# Patient Record
Sex: MALE | Race: WHITE | Employment: FULL TIME | URBAN - METROPOLITAN AREA
[De-identification: names, ages, dates, MRNs, and addresses within clinical notes are randomized per-mention and may not be internally consistent; named-entity substitution may affect disease eponyms.]

---

## 2020-07-21 ENCOUNTER — HOSPITAL ENCOUNTER (OUTPATIENT)
Dept: PHYSICAL THERAPY | Age: 60
Discharge: HOME | End: 2020-07-21
Payer: COMMERCIAL

## 2020-07-21 PROCEDURE — 97530 THERAPEUTIC ACTIVITIES: CPT | Performed by: PHYSICAL THERAPIST

## 2020-07-21 PROCEDURE — 97140 MANUAL THERAPY 1/> REGIONS: CPT | Performed by: PHYSICAL THERAPIST

## 2020-07-21 PROCEDURE — 97162 PT EVAL MOD COMPLEX 30 MIN: CPT | Performed by: PHYSICAL THERAPIST

## 2020-07-21 NOTE — PROGRESS NOTES
[facility] at [address] [place] [address]  Phone: [phone]   Fax: [phone]    Plan of Care/ Statement of Necessity for Physical Therapy Services     Patient name: Alok Garner Start of Care: 2020   Referral source: Sharyle Candle, MD : 1960    Medical Diagnosis: Low back pain [M54.5]   Onset Date:    Treatment Diagnosis: Low back pain    Prior Hospitalization: see medical history Provider#: [id number]   Medications: Verified on Patient summary List    Comorbidities: hx of knee surgeries , right hip resurfacing , factor 5 blood clot disorder, arthritis    Prior Level of Function: no limitations very active prior hx running 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 Assessment/ key information: Patient is a 63 y.o.male who presents to PT with Low back pain [M54.5]. Back pain x 1 year , tightness/stiffness , denies rad sx , no red flags noted , MRI per pt notes mild disc bulge at L5S1 left side. No nerve tension but tight HS L>R. Noted mild sacral torsion into flexion with mild short leg left and up crest which responded well to manual technique this session and noted looser SLR left post treatment ( approx 65 deg bilateral end session ) . LE strength is WNL 5/5 solid hold , normal reflexes LE bilat, = sensation. Tight mobility in all trunk motions no definative directional preference but states extension feels good. The patient will benefit from skilled PT services to address these deficits and facilitate return full activity level and improved quality of life.   Evaluation Complexity History HIGH Complexity :3+ comorbidities / personal factors will impact the outcome/ POC ; Examination HIGH Complexity : 4+ Standardized tests and measures addressing body structure, function, activity limitation and / or participation in recreation  ;Presentation MEDIUM Complexity : Evolving with changing characteristics  ; Clinical Decision Making MEDIUM Complexity : FOTO score of 26-74  Overall Complexity Rating: MEDIUM  Problem List: pain affecting function, decrease ROM, decrease strength, impaired gait/ balance and decrease ADL/ functional abilitiies   Treatment Plan may include any combination of the following: Therapeutic exercise, Therapeutic activities, Neuromuscular re-education, Physical agent/modality, Gait/balance training, Manual therapy, Aquatic therapy, Patient education, Self Care training and Functional mobility training  Patient / Family readiness to learn indicated by: asking questions, trying to perform skills and interest  Persons(s) to be included in education: patient (P)  Barriers to Learning/Limitations: None  Measures taken if barriers to learning:   Patient Goal (s): better continuous sleep   Patient Self Reported Health Status: good  Rehabilitation Potential: good  Short Term Goals: STG- To be accomplished in 2 week(s):  1. Pt will be compliant with HEP for max progression toward all goals and return to PLOF. Eval:started on extension focused mobility ex and trunk rotation , issued handout      2. Pt pain will improve >= 40% to allow pt improved sleep and tolerance to daily activity. Eval:max 7/10 and poor sleep         Long Term Goals: LTG- To be accomplished in 4 week(s):     1. Pt will be independant with HEP for continued and ongoing progression following discharge toward full functional mobility. Eval:started on HEP      2. Pt pain will improve >= 80% to allow pt return to PLOF. Eval:7/10 max      3. Pt FOTO score will increase by >=14  points to show improvement in functional mobility. Eval:53/100    will reassess every 5th visit      4.  Pt trunk  ROM and LE flexibility will improve to full pressup , standing flexion fingers to distal shin , sitting trunk rotation >= 60 deg , LE SLR >= 70 deg , and increase hip flexor mobility to allow upright stance  to allow pt to improve functional mobility ease with walking lifting dressing and other ADL. Eval:pressup 50%, standing flexion fingers knees to mid shin, trunk rotation in sitting left 43 deg , right 50 deg , noted flexed at hips in stance , SLR 63-65 deg       Frequency / Duration: Patient to be seen 2 times per week for 4 weeks. Patient/ Caregiver education and instruction: Diagnosis, prognosis, self care, activity modification and exercises   [x]  Plan of care has been reviewed with ZOLTAN Land, PT 7/21/2020 4:41 PM  _____________________________________________________________________  I certify that the above Therapy Services are being furnished while the patient is under my care. I agree with the treatment plan and certify that this therapy is necessary.     Physician's Signature:____________Date:_________TIME:________    ** Signature, Date and Time must be completed for valid certification **    Please sign and return to [facility] at [address] [place] [address]  Phone: [phone]   Fax: [phone]

## 2020-07-21 NOTE — PROGRESS NOTES
PT DAILY TREATMENT NOTE    Patient Name: Kajal Williamson  Date:2020  : 1960  [x]  Patient  Verified  Payor: Apoorva Mendoza / Plan: Mare Sabillon / Product Type: Commerical /    In time:1000  Out time:1105   Total Treatment Time (min): 65  Total Timed Codes (min): 25  1:1 Treatment Time ( only): 65   Visit #: 1 of 8    Treatment Area: Low back pain [M54.5]    SUBJECTIVE  Pain Level (0-10 scale): 4 tightness   Any medication changes, allergies to medications, adverse drug reactions, diagnosis change, or new procedure performed?: [x] No    [] Yes (see summary sheet for update)  Subjective functional status/changes:   [] No changes reported  Pt evaluated this date for c/o back pain / tightness x 1 year mild less after recent injection but still present , no radicular sx but MRI notes mild disc bulge per pt at L5 S1 left side, see paper evaluation for full report and measures      OBJECTIVE    40 min [x]Eval                  []Re-Eval       15 min Therapeutic Activity:  [x]  See flow sheet : beginning ex pressups , LTR , sitting rotation , standing extension , discussed findings , posture    Rationale: increase ROM and increase strength  to improve the patients ability to increase ease with ADL with less pain. 10 min Manual Therapy:  MET for sacral torsion , PA mob to low lumbar during pressups . Rationale: decrease pain, increase ROM and increase tissue extensibility to improve the patients functional mobility with less pain . With   [] TE   [] TA   [] neuro   [] other: Patient Education: [x] Review HEP    [] Progressed/Changed HEP based on:   [] positioning   [] body mechanics   [] transfers   [] heat/ice application    [] other:      Other Objective/Functional Measures: FOTO: 53 /100     Pain Level (0-10 scale) post treatment: 4 same     ASSESSMENT/Changes in Function: Patient is a 63 y.o.male who presents to PT with Low back pain [M54.5].   Back pain x 1 year , tightness/stiffness , denies rad sx , no red flags noted , MRI per pt notes mild disc bulge at L5S1 left side. No nerve tension but tight HS L>R. Noted mild sacral torsion into flexion with mild short leg left and up crest which responded well to manual technique this session and noted looser SLR left post treatment ( approx 65 deg bilateral end session ) . LE strength is WNL 5/5 solid hold , normal reflexes LE bilat, = sensation. Tight mobility in all trunk motions no definative directional preference but states extension feels good. The patient will benefit from skilled PT services to address these deficits and facilitate return full activity level and improved quality of life. Patient will continue to benefit from skilled PT services to modify and progress therapeutic interventions, address functional mobility deficits, address ROM deficits, address strength deficits, analyze and address soft tissue restrictions, analyze and cue movement patterns, analyze and modify body mechanics/ergonomics and assess and modify postural abnormalities to attain remaining goals. [x]  See Plan of Care  []  See progress note/recertification  []  See Discharge Summary         Progress towards goals / Updated goals:  Short Term Goals: STG- To be accomplished in 2 week(s):  1. Pt will be compliant with HEP for max progression toward all goals and return to PLOF. Eval:started on extension focused mobility ex and trunk rotation , issued handout     2. Pt pain will improve >= 40% to allow pt improved sleep and tolerance to daily activity. Eval:max 7/10 and poor sleep       Long Term Goals: LTG- To be accomplished in 4 week(s):    1. Pt will be independant with HEP for continued and ongoing progression following discharge toward full functional mobility. Eval:started on HEP     2.Pt pain will improve >= 80% to allow pt return to PLOF. Eval:7/10 max     3. Pt FOTO score will increase by >=14  points to show improvement in functional mobility. Eval:53/100    will reassess every 5th visit     4. Pt trunk  ROM and LE flexibility will improve to full pressup , standing flexion fingers to distal shin , sitting trunk rotation >= 60 deg , LE SLR >= 70 deg , and increase hip flexor mobility to allow upright stance  to allow pt to improve functional mobility ease with walking lifting dressing and other ADL. Eval:pressup 50%, standing flexion fingers knees to mid shin, trunk rotation in sitting left 43 deg , right 50 deg , noted flexed at hips in stance , SLR 63-65 deg         PLAN  [x]  Upgrade activities as tolerated     [x]  Continue plan of care  []  Update interventions per flow sheet       []  Discharge due to:_  []  Other:_      Marie Vincent, PT 7/21/2020  10:05 AM    No future appointments.

## 2020-07-24 ENCOUNTER — HOSPITAL ENCOUNTER (OUTPATIENT)
Dept: PHYSICAL THERAPY | Age: 60
Discharge: HOME | End: 2020-07-24
Payer: COMMERCIAL

## 2020-07-24 PROCEDURE — 97110 THERAPEUTIC EXERCISES: CPT | Performed by: PHYSICAL THERAPIST

## 2020-07-24 NOTE — PROGRESS NOTES
PT DAILY TREATMENT NOTE    Patient Name: Mane Gramajo  Date:2020  : 1960  [x]  Patient  Verified  Payor: Juanita Aggarwal / Plan: Michael Arora / Product Type: Commerical /    In time: 8:00  Out time:9:11  Total Treatment Time (min): 71  Total Timed Codes (min): 71   Visit #: 2 of 8    Treatment Area: Low back pain [M54.5]    SUBJECTIVE  Pain Level (0-10 scale): 4  Any medication changes, allergies to medications, adverse drug reactions, diagnosis change, or new procedure performed?: [x] No    [] Yes (see summary sheet for update)  Subjective functional status/changes:   [] No changes reported  Feels good. The injection probably reduced 50% of my pain. Just stiff in the morning. The pain used to be sharp    OBJECTIVE    71 min Therapeutic Exercise:  [x] See flow sheet :   Rationale: increase ROM, increase strength and decrease pain to improve the patients ability to complete ADLs    With   [] TE   [] TA   [] neuro   [] other: Patient Education: [x] Review HEP    [] Progressed/Changed HEP based on:   [] positioning   [] body mechanics   [] transfers   [] heat/ice application    [] other:      Other Objective/Functional Measures: NA     Pain Level (0-10 scale) post treatment: 3    ASSESSMENT/Changes in Function: Patient responds well to treatment session. Patient has a reduction in pain with left hip extension isometrics. Provided with updated HEP and educated on appropriate progression. Will progress as tolerated.  No adverse effects were noted from today's treatment session       Patient will continue to benefit from skilled PT services to modify and progress therapeutic interventions, address functional mobility deficits, address ROM deficits, address strength deficits, analyze and address soft tissue restrictions, analyze and cue movement patterns, analyze and modify body mechanics/ergonomics, assess and modify postural abnormalities    []  See Plan of Care  []  See progress note/recertification  []  See Discharge Summary         Progress towards goals / Updated goals:  Short Term Goals: STG- To be accomplished in 2 week(s):  1. Pt will be compliant with HEP for max progression toward all goals and return to PLOF. Eval:started on extension focused mobility ex and trunk rotation , issued handout      2. Pt pain will improve >= 40% to allow pt improved sleep and tolerance to daily activity. Eval:max 7/10 and poor sleep         Long Term Goals: LTG- To be accomplished in 4 week(s):     1. Pt will be independant with HEP for continued and ongoing progression following discharge toward full functional mobility. Eval:started on HEP      2. Pt pain will improve >= 80% to allow pt return to PLOF. Eval:7/10 max      3. Pt FOTO score will increase by >=14  points to show improvement in functional mobility. Eval:53/100    will reassess every 5th visit      4. Pt trunk  ROM and LE flexibility will improve to full pressup , standing flexion fingers to distal shin , sitting trunk rotation >= 60 deg , LE SLR >= 70 deg , and increase hip flexor mobility to allow upright stance  to allow pt to improve functional mobility ease with walking lifting dressing and other ADL.    Eval:pressup 50%, standing flexion fingers knees to mid shin, trunk rotation in sitting left 43 deg , right 50 deg , noted flexed at hips in stance , SLR 63-65 deg     PLAN  []  Upgrade activities as tolerated     [x]  Continue plan of care  []  Update interventions per flow sheet       []  Discharge due to:_  []  Other:_      Yo Mcdermott PT, DPT 7/24/2020  8:08 AM    Future Appointments   Date Time Provider Frank iSn   8/5/2020  8:00 AM Elijah Romero PT, DPT [facility]   8/7/2020  8:00 AM Elijah Romero PT, DPT [facility]   8/12/2020  8:00 AM Elijah Romero PT, DPT [facility]   8/14/2020  8:00 AM Elijah Romero PT, DPT [facility]

## 2020-08-05 ENCOUNTER — HOSPITAL ENCOUNTER (OUTPATIENT)
Dept: PHYSICAL THERAPY | Age: 60
Discharge: HOME | End: 2020-08-05
Payer: COMMERCIAL

## 2020-08-05 PROCEDURE — 97110 THERAPEUTIC EXERCISES: CPT | Performed by: PHYSICAL THERAPIST

## 2020-08-05 NOTE — PROGRESS NOTES
PT DAILY TREATMENT NOTE    Patient Name: Mane Gramajo  Date:2020  : 1960  [x]  Patient  Verified  Payor: Juanita Aggarwal / Plan: Michael Arora / Product Type: Commerical /    In time:8:45   Out time:9:31  Total Treatment Time (min): 46  Total Timed Codes (min): 46   Visit #: 3 of 8    Treatment Area: Low back pain [M54.5]    SUBJECTIVE  Pain Level (0-10 scale): 4  Any medication changes, allergies to medications, adverse drug reactions, diagnosis change, or new procedure performed?: [x] No    [] Yes (see summary sheet for update)  Subjective functional status/changes:   [] No changes reported  Feels a little better. No issues while on vacation. OBJECTIVE    46 min Therapeutic Exercise:  [x] See flow sheet :   Rationale: increase ROM, increase strength and decrease pain to improve the patients ability to complete ADLs    With   [] TE   [] TA   [] neuro   [] other: Patient Education: [x] Review HEP    [] Progressed/Changed HEP based on:   [] positioning   [] body mechanics   [] transfers   [] heat/ice application    [] other:      Other Objective/Functional Measures: NA     Pain Level (0-10 scale) post treatment: 3    ASSESSMENT/Changes in Function: Patient responds well to treatment session. Patient is progressing well. Displays good form/technique with straight leg dead lifts today. Will progress as tolerated. No adverse effects were noted from today's treatment session       Patient will continue to benefit from skilled PT services to modify and progress therapeutic interventions, address functional mobility deficits, address ROM deficits, address strength deficits, analyze and address soft tissue restrictions, analyze and cue movement patterns, analyze and modify body mechanics/ergonomics, assess and modify postural abnormalities    []  See Plan of Care  []  See progress note/recertification  []  See Discharge Summary         Progress towards goals / Updated goals:  Short Term Goals:  To be accomplished in 2 weeks:   Patient will report compliance with HEP 1x/day to aid in rehabilitation program.   Status at IE: reports compliance, Met 8/5/2020   Current: Same as IE      Patient will increase lumbar ROM to 100% in all planes to aid in completion of ADLs. Status at IE:75% flexion   Current: Same as IE    Long Term Goals: To be accomplished in 4 weeks:   Patient will report pain no greater than 1-2/10 throughout entire day to aid in completion of ADLs. Status at IE:4/1-0   Current: Same as IE     Patent will be able to sit for 1-2hours to be able to drive to appointments and complete ADLs. Status at [address] after sitting for 30mins to hour or more   Current: Same as IE     Patient will improve FOTO score to 67 points to demonstrate improvement in functional status. Status at IE:53   Current: Same as IE    *FOTO score is an established functional score where 100 = no disability*          Goals below inappopriate. Discontinued. Short Term Goals: STG- To be accomplished in 2 week(s):  1.  Pt will be compliant with HEP for max progression toward all goals and return to PLOF. Eval:started on extension focused mobility ex and trunk rotation , issued handout      2. Pt pain will improve >= 40% to allow pt improved sleep and tolerance to daily activity. Eval:max 7/10 and poor sleep         Long Term Goals: LTG- To be accomplished in 4 week(s):     1.  Pt will be independant with HEP for continued and ongoing progression following discharge toward full functional mobility. Eval:started on HEP      2. Pt pain will improve >= 80% to allow pt return to PLOF. Eval:7/10 max      3. Pt FOTO score will increase by >=14  points to show improvement in functional mobility. Eval:53/100    will reassess every 5th visit      4.  Pt trunk  ROM and LE flexibility will improve to full pressup , standing flexion fingers to distal shin , sitting trunk rotation >= 60 deg , LE SLR >= 70 deg , and increase hip flexor mobility to allow upright stance  to allow pt to improve functional mobility ease with walking lifting dressing and other ADL.    Eval:pressup 50%, standing flexion fingers knees to mid shin, trunk rotation in sitting left 43 deg , right     PLAN  []  Upgrade activities as tolerated     [x]  Continue plan of care  []  Update interventions per flow sheet       []  Discharge due to:_  []  Other:_      Ash Pickett, PT, DPT 8/5/2020  9:00 AM    Future Appointments   Date Time Provider Frank Sin   8/7/2020  8:00 AM Alvia Krabbe, PT, DPT [facility]   8/12/2020  8:00 AM Alvia Krabbe, PT, DPT [facility]   8/14/2020  8:00 AM Alvia Krabbe, PT, DPT [facility]

## 2020-08-07 ENCOUNTER — HOSPITAL ENCOUNTER (OUTPATIENT)
Dept: PHYSICAL THERAPY | Age: 60
Discharge: HOME | End: 2020-08-07
Payer: COMMERCIAL

## 2020-08-07 PROCEDURE — 97110 THERAPEUTIC EXERCISES: CPT | Performed by: PHYSICAL THERAPIST

## 2020-08-07 NOTE — PROGRESS NOTES
PT DAILY TREATMENT NOTE    Patient Name: Seb Zimmer  Date:2020  : 1960  [x]  Patient  Verified  Payor: Ghislaine Andres / Plan: Barbee Kayser / Product Type: Commerical /    In time:8:00  Out time:8:53  Total Treatment Time (min): 53  Total Timed Codes (min):53   Visit #: 4 of 8    Treatment Area: Low back pain [M54.5]    SUBJECTIVE  Pain Level (0-10 scale): 3  Any medication changes, allergies to medications, adverse drug reactions, diagnosis change, or new procedure performed?: [x] No    [] Yes (see summary sheet for update)  Subjective functional status/changes:   [] No changes reported  Feels alright. Still just stiff. OBJECTIVE    53 min Therapeutic Exercise:  [x] See flow sheet :   Rationale: increase ROM, increase strength and decrease pain to improve the patients ability to complete ADLs    With   [] TE   [] TA   [] neuro   [] other: Patient Education: [x] Review HEP    [] Progressed/Changed HEP based on:   [] positioning   [] body mechanics   [] transfers   [] heat/ice application    [] other:      Other Objective/Functional Measures: NA     Pain Level (0-10 scale) post treatment: 3    ASSESSMENT/Changes in Function: Patient responds well to treatment session. Patient is challenged with increases today. Will progress as tolerated. . No adverse effects were noted from today's treatment session       Patient will continue to benefit from skilled PT services to modify and progress therapeutic interventions, address functional mobility deficits, address ROM deficits, address strength deficits, analyze and address soft tissue restrictions, analyze and cue movement patterns, analyze and modify body mechanics/ergonomics, assess and modify postural abnormalities    []  See Plan of Care  []  See progress note/recertification  []  See Discharge Summary         Progress towards goals / Updated goals:  Short Term Goals:  To be accomplished in 2 weeks:                   Patient will report compliance with HEP 1x/day to aid in rehabilitation program.                   Status at IE: NA                   Current: reports compliance, Met 8/5/2020                      Patient will increase lumbar ROM to 100% in all planes to aid in completion of ADLs. Status at IE:75% flexion                   Current: Same as IE     Long Term Goals: To be accomplished in 4 weeks:                   Patient will report pain no greater than 1-2/10 throughout entire day to aid in completion of ADLs. Status at IE:4/1-0                   Current: Same as IE                      Patent will be able to sit for 1-2hours to be able to drive to appointments and complete ADLs. Status at [address] after sitting for 30mins to hour or more                   Current: Same as IE                      Patient will improve FOTO score to 67 points to demonstrate improvement in functional status.                    Status at IE:53                   Current: Same as IE                          *FOTO score is an established functional score where 100 = no disability*    PLAN  []  Upgrade activities as tolerated     [x]  Continue plan of care  []  Update interventions per flow sheet       []  Discharge due to:_  []  Other:_      Amy Mahmood PT, DPT 8/7/2020  8:22 AM    Future Appointments   Date Time Provider Frank Sin   8/12/2020  8:00 AM Franchesca De Leon PT, DPT [facility]   8/14/2020  8:00 AM Franchesca De Leon PT, DPT [facility]

## 2020-08-12 ENCOUNTER — HOSPITAL ENCOUNTER (OUTPATIENT)
Dept: PHYSICAL THERAPY | Age: 60
Discharge: HOME | End: 2020-08-12
Payer: COMMERCIAL

## 2020-08-12 PROCEDURE — 97110 THERAPEUTIC EXERCISES: CPT | Performed by: PHYSICAL THERAPIST

## 2020-08-12 NOTE — PROGRESS NOTES
PT DAILY TREATMENT NOTE    Patient Name: Larry Serrano  Date:2020  : 1960  [x]  Patient  Verified  Payor: Ryan Goff / Plan: Estevan Pang / Product Type: Commerical /    In time:8:00  Out time:8;46  Total Treatment Time (min): 46  Total Timed Codes (min): 46   Visit #: 5 of 8    Treatment Area: Low back pain [M54.5]    SUBJECTIVE  Pain Level (0-10 scale): 3  Any medication changes, allergies to medications, adverse drug reactions, diagnosis change, or new procedure performed?: [x] No    [] Yes (see summary sheet for update)  Subjective functional status/changes:   [] No changes reported  Had a rough day  after helping my son move into college on Saturday. But I feel good today. OBJECTIVE    46 min Therapeutic Exercise:  [x] See flow sheet :   Rationale: increase ROM, increase strength and decrease pain to improve the patients ability to complete ADLs       With   [] TE   [] TA   [] neuro   [] other: Patient Education: [x] Review HEP    [] Progressed/Changed HEP based on:   [] positioning   [] body mechanics   [] transfers   [] heat/ice application    [] other:      Other Objective/Functional Measures: NA     Pain Level (0-10 scale) post treatment: 3    ASSESSMENT/Changes in Function: Patient responds well to treatment session. Patient is progressing well. Has minimal pain or difficulty with exercises. We did not make any increase owing to his laborious weekend. Will attempt to progress exercises next session. . No adverse effects were noted from today's treatment session       Patient will continue to benefit from skilled PT services to modify and progress therapeutic interventions, address functional mobility deficits, address ROM deficits, address strength deficits, analyze and address soft tissue restrictions, analyze and cue movement patterns, analyze and modify body mechanics/ergonomics, assess and modify postural abnormalities    []  See Plan of Care  []  See progress note/recertification  []  See Discharge Summary         Progress towards goals / Updated goals:  Short Term Goals: To be accomplished in 2 weeks:                   UFHPJTS will report compliance with HEP 1x/day to aid in rehabilitation program.                   Status at IE: NA                   Current: reports compliance, Met 8/5/2020                      Patient will increase lumbar ROM to 100% in all planes to aid in completion of ADLs.                  Status at IE:75% flexion                   Current: Same as IE     Long Term Goals: To be accomplished in 4 weeks:                   Patient will report pain no greater than 1-2/10 throughout entire day to aid in completion of ADLs.                  Status at IE:4/1-0                   Current: Same as IE                      Patent will be able to sit for 1-2hours to be able to drive to appointments and complete ADLs.                  Status at [address] after sitting for 30mins to hour or more                   Current: Same as IE                      Patient will improve FOTO score to 67 points to demonstrate improvement in functional status.                    Status at IE:53                   Current: Same as LA                          *FOTO score is an established functional score where 100 = no disability*    PLAN  []  Upgrade activities as tolerated     [x]  Continue plan of care  []  Update interventions per flow sheet       []  Discharge due to:_  []  Other:_      Jimenez Sequeira PT, DPT 8/12/2020  8:21 AM    Future Appointments   Date Time Provider Frank Sin   8/14/2020  8:00 AM Eloisa Wesley PT, DPT [facility]   8/17/2020  9:30 AM Eloisa Wesley PT, DPT [facility]   8/26/2020  8:00 AM Eloisa Wesley PT, DPT [facility]   8/28/2020  9:30 AM Eloisa Wesley PT, DPT [facility]

## 2020-08-14 ENCOUNTER — HOSPITAL ENCOUNTER (OUTPATIENT)
Dept: PHYSICAL THERAPY | Age: 60
Discharge: HOME | End: 2020-08-14
Payer: COMMERCIAL

## 2020-08-14 PROCEDURE — 97110 THERAPEUTIC EXERCISES: CPT | Performed by: PHYSICAL THERAPIST

## 2020-08-14 NOTE — PROGRESS NOTES
PT DAILY TREATMENT NOTE    Patient Name: Sinai Hernandez  Date:2020  : 1960  [x]  Patient  Verified  Payor: Justin Deras / Plan: Camille Herrera / Product Type: Commerical /    In time:8:00  Out time:8:47  Total Treatment Time (min): 47  Total Timed Codes (min): 47  1:1 Treatment Time ( only): 52   Visit #: 6 of 8    Treatment Area: Low back pain [M54.5]    SUBJECTIVE  Pain Level (0-10 scale): 0  Any medication changes, allergies to medications, adverse drug reactions, diagnosis change, or new procedure performed?: [x] No    [] Yes (see summary sheet for update)  Subjective functional status/changes:   [] No changes reported  Feels okay. The LBP is getting better, even the stiffness seems to be improving    OBJECTIVE    47 min Therapeutic Exercise:  [x] See flow sheet :   Rationale: increase ROM, increase strength and decrease pain to improve the patients ability to complete ADLs         With   [] TE   [] TA   [] neuro   [] other: Patient Education: [x] Review HEP    [] Progressed/Changed HEP based on:   [] positioning   [] body mechanics   [] transfers   [] heat/ice application    [] other:      Other Objective/Functional Measures: NA     Pain Level (0-10 scale) post treatment: 0    ASSESSMENT/Changes in Function: Patient responds well to treatment session. Patient is progressing well. Continues to be challenged with exercises as prescribed.  . No adverse effects were noted from today's treatment session       Patient will continue to benefit from skilled PT services to modify and progress therapeutic interventions, address functional mobility deficits, address ROM deficits, address strength deficits, analyze and address soft tissue restrictions, analyze and cue movement patterns, analyze and modify body mechanics/ergonomics, assess and modify postural abnormalities,    []  See Plan of Care  []  See progress note/recertification  []  See Discharge Summary         Progress towards goals / Updated goals:  Short Term Goals: To be accomplished in 2 weeks:                   EIMREDU will report compliance with HEP 1x/day to aid in rehabilitation program.                   Status at IE: NA                   Current: reports compliance, Met 8/5/2020                      Patient will increase lumbar ROM to 100% in all planes to aid in completion of ADLs.                  Status at IE:75% flexion                   Current: Same as IE     Long Term Goals: To be accomplished in 4 weeks:                   Patient will report pain no greater than 1-2/10 throughout entire day to aid in completion of ADLs.                  Status at IE:4/1-0                   Current: Same as IE                      Patent will be able to sit for 1-2hours to be able to drive to appointments and complete ADLs.                  Status at [address] after sitting for 30mins to hour or more                   Current: Same as IE                      Patient will improve FOTO score to 67 points to demonstrate improvement in functional status.                    Status at IE:53                   Current: Same as DT                          *FOTO score is an established functional score where 100 = no disability*    PLAN  []  Upgrade activities as tolerated     [x]  Continue plan of care  []  Update interventions per flow sheet       []  Discharge due to:_  []  Other:_      Ursula Downs PT, DPT 8/14/2020  8:05 AM    Future Appointments   Date Time Provider Frank Sin   8/17/2020  9:30 AM Olena James PT, DPT [facility]   8/26/2020  8:00 AM Olena James PT, DPT [facility]   8/28/2020  9:30 AM Olena James PT, DPT [facility]

## 2020-08-17 ENCOUNTER — HOSPITAL ENCOUNTER (OUTPATIENT)
Dept: PHYSICAL THERAPY | Age: 60
Discharge: HOME | End: 2020-08-17
Payer: COMMERCIAL

## 2020-08-17 PROCEDURE — 97110 THERAPEUTIC EXERCISES: CPT | Performed by: PHYSICAL THERAPIST

## 2020-08-17 NOTE — PROGRESS NOTES
PT DAILY TREATMENT NOTE    Patient Name: Carlene Griffith  Date:2020  : 1960  [x]  Patient  Verified  Payor: Maria Alejandra Seen / Plan: Abundio Harry / Product Type: Commerical /    In time:9:27  Out time:10:15  Total Treatment Time (min): 48  Total Timed Codes (min): 48   Visit #: 7 of 8    Treatment Area: Low back pain [M54.5]    SUBJECTIVE  Pain Level (0-10 scale): 0  Any medication changes, allergies to medications, adverse drug reactions, diagnosis change, or new procedure performed?: [x] No    [] Yes (see summary sheet for update)  Subjective functional status/changes:   [] No changes reported  Feels good. No new issues    OBJECTIVE    48 min Therapeutic Exercise:  [x] See flow sheet :   Rationale: increase ROM, increase strength and decrease pain to improve the patients ability to complete ADLs  With   [] TE   [] TA   [] neuro   [] other: Patient Education: [x] Review HEP    [] Progressed/Changed HEP based on:   [] positioning   [] body mechanics   [] transfers   [] heat/ice application    [] other:      Other Objective/Functional Measures: NA     Pain Level (0-10 scale) post treatment: 0    ASSESSMENT/Changes in Function: Patient responds well to treatment session. Patient is progressing well. Pain is decreased. Briefly discussed plan for potential discharge to independent [facility] at the end of scheduled appointments.  . No adverse effects were noted from today's treatment session      Patient will continue to benefit from skilled PT services to modify and progress therapeutic interventions, address functional mobility deficits, address ROM deficits, address strength deficits, analyze and address soft tissue restrictions, analyze and cue movement patterns, analyze and modify body mechanics/ergonomics, assess and modify postural abnormalities    []  See Plan of Care  []  See progress note/recertification  []  See Discharge Summary         Progress towards goals / Updated goals:  Short Term Goals: To be accomplished in 2 weeks:                   Patient will report compliance with HEP 1x/day to aid in rehabilitation program.                   Status at IE: NA                   Current: reports compliance, Met 8/5/2020                      Patient will increase lumbar ROM to 100% in all planes to aid in completion of ADLs.                  Status at IE:75% flexion                   Current:100% flexion without pain, Met 8/17/2020     Long Term Goals: To be accomplished in 4 weeks:                   Patient will report pain no greater than 1-2/10 throughout entire day to aid in completion of ADLs.                  Status at IE:4/1-0                   Current: Same as IE                      Patent will be able to sit for 1-2hours to be able to drive to appointments and complete ADLs.                  Status at [address] after sitting for 30mins to hour or more                   Current: Same as IE                      Patient will improve FOTO score to 67 points to demonstrate improvement in functional status.                    Status at IE:53                   Current: Same as ZQ                          *FOTO score is an established functional score where 100 = no disability*      PLAN  []  Upgrade activities as tolerated     [x]  Continue plan of care  []  Update interventions per flow sheet       []  Discharge due to:_  []  Other:_      Roel Nowak PT, DPT 8/17/2020  9:40 AM    Future Appointments   Date Time Provider Frank Sin   8/26/2020  8:00 AM Raciel Acosta PT, DPT [facility]   8/28/2020  9:30 AM Raciel Acosta PT, DPT [facility]

## 2020-08-26 ENCOUNTER — HOSPITAL ENCOUNTER (OUTPATIENT)
Dept: PHYSICAL THERAPY | Age: 60
Discharge: HOME | End: 2020-08-26
Payer: COMMERCIAL

## 2020-08-26 PROCEDURE — 97110 THERAPEUTIC EXERCISES: CPT | Performed by: PHYSICAL THERAPIST

## 2020-08-26 NOTE — PROGRESS NOTES
[facility] at [address] [place] [address] Drive: [phone]   Fax: [phone]  Discharge Summary  Patient Name: Lisa Card : 1960   Medical   Diagnosis: Low back pain [M54.5] Treatment Diagnosis: Low back pain    Onset Date: 1 year     Referral Source: Chaz Judge MD [facility]): 2020   Prior Hospitalization: See medical history Provider #: [id number]   Prior Level of Function: Independent w/ ADLs, sedentary   Comorbidities: OA, blood-clotting disorder,    Medications: Verified on Patient Summary List      ===========================================================================================  Assessment / Summary of Care:  Lisa Card is a 63 y.o.  yo male with 1 year hx of low back pain. Patient has made good progress to date. His pain is reduced and he is capable of progressing via independent HEP at this time. . No adverse effects were noted from today's treatment session    ===========================================================================================    Plan: Discharge to independent HEP. Goals:   Short Term Goals: To be accomplished in 2 weeks:                   QILNDIW will report compliance with HEP 1x/day to aid in rehabilitation program.                   Status at IE: NA                   Current: reports compliance, Met 2020                      Patient will increase lumbar ROM to 100% in all planes to aid in completion of ADLs.                  Status at IE:75% flexion                   Current:100% flexion without pain, Met 2020     Long Term Goals: To be accomplished in 4 weeks:                   Patient will report pain no greater than 1-2/10 throughout entire day to aid in completion of ADLs.                    Status at IE:-0                   Current: 0-310, not met 2020                      Patent will be able to sit for 1-2hours to be able to drive to appointments and complete ADLs.                   Status at IE:Pain after sitting for 30mins to hour or more                   Current: able to sit for 1-2 hours, Met 8/26/2020                      Patient will improve FOTO score to 67 points to demonstrate improvement in functional status.                  Status at IE:53                   Current: 72, met 8/26/2020                         *FOTO score is an established functional score where 100 = no disability*       ===========================================================================================  Subjective: Feels good. Just stiff. Ready to be discharged to independent HEP.        Objective: % flexion    Therapist Signature: Roel Nowak PT, ALKISHA, OCS Date: 8/26/2020     Time: 11:10 AM

## 2020-08-26 NOTE — PROGRESS NOTES
PT DAILY TREATMENT NOTE    Patient Name: Manuel Echevarria  Date:2020  : 1960  [x]  Patient  Verified  Payor: Natalie Diaz / Plan: Reginal Bernheim / Product Type: Commerical /    In time:8:05  Out time:8:51  Total Treatment Time (min): 46  Total Timed Codes (min): 46   Visit #: 8 of 8    Treatment Area: Low back pain [M54.5]    SUBJECTIVE  Pain Level (0-10 scale): 0  Any medication changes, allergies to medications, adverse drug reactions, diagnosis change, or new procedure performed?: [x] No    [] Yes (see summary sheet for update)  Subjective functional status/changes:   [] No changes reported  Feels good. Just stiff. Ready to be discharged to independent HEP. OBJECTIVE    46 min Therapeutic Exercise:  [x] See flow sheet :   Rationale: increase ROM, increase strength and decrease pain to improve the patients ability to complete ADLs    With   [] TE   [] TA   [] neuro   [] other: Patient Education: [x] Review HEP    [] Progressed/Changed HEP based on:   [] positioning   [] body mechanics   [] transfers   [] heat/ice application    [] other:      Other Objective/Functional Measures: NA     Pain Level (0-10 scale) post treatment: 0    ASSESSMENT/Changes in Function: Patient responds well to treatment session. Patient has made good progress to date. His pain is reduced and he is capable of progressing via independent HEP at this time. . No adverse effects were noted from today's treatment session    []  See Plan of Care  []  See progress note/recertification  [x]  See Discharge Summary         Progress towards goals / Updated goals:  Short Term Goals: To be accomplished in 2 weeks:                   CQLEHJS will report compliance with HEP 1x/day to aid in rehabilitation program.                   Status at IE: NA                   Current: reports compliance, Met 2020                      Patient will increase lumbar ROM to 100% in all planes to aid in completion of ADLs.                    Status at IE:75% flexion                   Current:100% flexion without pain, Met 8/17/2020     Long Term Goals: To be accomplished in 4 weeks:                   Patient will report pain no greater than 1-2/10 throughout entire day to aid in completion of ADLs.                  Status at IE:4/1-0                   Current: Same as IE                      Patent will be able to sit for 1-2hours to be able to drive to appointments and complete ADLs.                  Status at [address] after sitting for 30mins to hour or more                   Current: Same as IE                      Patient will improve FOTO score to 67 points to demonstrate improvement in functional status.                  Status at IE:53                   Current: Same as IB                          *FOTO score is an established functional score where 100 = no disability*       PLAN  []  Upgrade activities as tolerated     []  Continue plan of care  []  Update interventions per flow sheet       [x]  Discharge due to:_  []  Other:_      Wayne Bucio, PT, DPT 8/26/2020  8:10 AM    No future appointments.

## 2020-08-28 ENCOUNTER — APPOINTMENT (OUTPATIENT)
Dept: PHYSICAL THERAPY | Age: 60
End: 2020-08-28
Payer: COMMERCIAL

## 2022-01-05 ENCOUNTER — HOSPITAL ENCOUNTER (OUTPATIENT)
Dept: LAB | Age: 62
Discharge: HOME | End: 2022-01-05
Payer: COMMERCIAL

## 2022-01-05 PROCEDURE — U0003 INFECTIOUS AGENT DETECTION BY NUCLEIC ACID (DNA OR RNA); SEVERE ACUTE RESPIRATORY SYNDROME CORONAVIRUS 2 (SARS-COV-2) (CORONAVIRUS DISEASE [COVID-19]), AMPLIFIED PROBE TECHNIQUE, MAKING USE OF HIGH THROUGHPUT TECHNOLOGIES AS DESCRIBED BY CMS-2020-01-R: HCPCS

## 2022-01-06 LAB — SARS-COV-2, NAA: NOT DETECTED

## 2024-10-21 ENCOUNTER — HOSPITAL ENCOUNTER (OUTPATIENT)
Facility: HOSPITAL | Age: 64
Discharge: HOME | End: 2024-10-24
Payer: COMMERCIAL

## 2024-10-21 ENCOUNTER — TRANSCRIBE ORDERS (OUTPATIENT)
Facility: HOSPITAL | Age: 64
End: 2024-10-21

## 2024-10-21 DIAGNOSIS — M16.12 PRIMARY OSTEOARTHRITIS OF LEFT HIP: Primary | ICD-10-CM

## 2024-10-21 DIAGNOSIS — M16.12 PRIMARY OSTEOARTHRITIS OF LEFT HIP: ICD-10-CM

## 2024-10-21 LAB
APPEARANCE UR: CLEAR
BILIRUB UR QL: NEGATIVE
COLOR UR: YELLOW
EST. AVERAGE GLUCOSE BLD GHB EST-MCNC: 120 MG/DL
GLUCOSE UR STRIP.AUTO-MCNC: NEGATIVE MG/DL
HBA1C MFR BLD: 5.8 % (ref 4.2–5.6)
HGB UR QL STRIP: NEGATIVE
KETONES UR QL STRIP.AUTO: NEGATIVE MG/DL
LEUKOCYTE ESTERASE UR QL STRIP.AUTO: NEGATIVE
NITRITE UR QL STRIP.AUTO: NEGATIVE
PH UR STRIP: 5.5 (ref 5–8)
PROT UR STRIP-MCNC: NEGATIVE MG/DL
SP GR UR REFRACTOMETRY: 1.02 (ref 1–1.03)
UROBILINOGEN UR QL STRIP.AUTO: 1 EU/DL (ref 0.2–1)

## 2024-10-21 PROCEDURE — 87086 URINE CULTURE/COLONY COUNT: CPT

## 2024-10-21 PROCEDURE — 81003 URINALYSIS AUTO W/O SCOPE: CPT

## 2024-10-21 PROCEDURE — 36415 COLL VENOUS BLD VENIPUNCTURE: CPT

## 2024-10-21 PROCEDURE — 83036 HEMOGLOBIN GLYCOSYLATED A1C: CPT

## 2024-10-22 LAB
BACTERIA SPEC CULT: NORMAL
SERVICE CMNT-IMP: NORMAL
SERVICE CMNT-IMP: NORMAL

## 2024-10-30 ENCOUNTER — HOSPITAL ENCOUNTER (OUTPATIENT)
Facility: HOSPITAL | Age: 64
Discharge: HOME | End: 2024-11-02
Payer: COMMERCIAL

## 2024-10-30 ENCOUNTER — TRANSCRIBE ORDERS (OUTPATIENT)
Facility: HOSPITAL | Age: 64
End: 2024-10-30

## 2024-10-30 DIAGNOSIS — M16.12 PRIMARY OSTEOARTHRITIS OF LEFT HIP: Primary | ICD-10-CM

## 2024-10-30 DIAGNOSIS — M16.12 PRIMARY OSTEOARTHRITIS OF LEFT HIP: ICD-10-CM

## 2024-10-30 DIAGNOSIS — Z01.818 PRE-OP TESTING: ICD-10-CM

## 2024-10-30 LAB
ALBUMIN SERPL-MCNC: 4 G/DL (ref 3.4–5)
ALBUMIN/GLOB SERPL: 1.3 (ref 0.8–1.7)
ALP SERPL-CCNC: 91 U/L (ref 45–117)
ALT SERPL-CCNC: 94 U/L (ref 16–61)
ANION GAP SERPL CALC-SCNC: 9 MMOL/L (ref 3–18)
APTT PPP: 27.6 SEC (ref 23–36.4)
AST SERPL-CCNC: 34 U/L (ref 10–38)
BASOPHILS # BLD: 0.1 K/UL (ref 0–0.1)
BASOPHILS NFR BLD: 1 % (ref 0–2)
BILIRUB SERPL-MCNC: 0.6 MG/DL (ref 0.2–1)
BUN SERPL-MCNC: 15 MG/DL (ref 7–18)
BUN/CREAT SERPL: 12 (ref 12–20)
CALCIUM SERPL-MCNC: 9.9 MG/DL (ref 8.5–10.1)
CHLORIDE SERPL-SCNC: 104 MMOL/L (ref 100–111)
CO2 SERPL-SCNC: 28 MMOL/L (ref 21–32)
CREAT SERPL-MCNC: 1.21 MG/DL (ref 0.6–1.3)
DIFFERENTIAL METHOD BLD: ABNORMAL
EOSINOPHIL # BLD: 0.2 K/UL (ref 0–0.4)
EOSINOPHIL NFR BLD: 3 % (ref 0–5)
ERYTHROCYTE [DISTWIDTH] IN BLOOD BY AUTOMATED COUNT: 13.6 % (ref 11.6–14.5)
GLOBULIN SER CALC-MCNC: 3.2 G/DL (ref 2–4)
GLUCOSE SERPL-MCNC: 106 MG/DL (ref 74–99)
HCT VFR BLD AUTO: 51.1 % (ref 36–48)
HGB BLD-MCNC: 17 G/DL (ref 13–16)
IMM GRANULOCYTES # BLD AUTO: 0 K/UL (ref 0–0.04)
IMM GRANULOCYTES NFR BLD AUTO: 0 % (ref 0–0.5)
INR PPP: 1 (ref 0.9–1.1)
LYMPHOCYTES # BLD: 1.8 K/UL (ref 0.9–3.6)
LYMPHOCYTES NFR BLD: 27 % (ref 21–52)
MCH RBC QN AUTO: 28.1 PG (ref 24–34)
MCHC RBC AUTO-ENTMCNC: 33.3 G/DL (ref 31–37)
MCV RBC AUTO: 84.5 FL (ref 78–100)
MONOCYTES # BLD: 0.8 K/UL (ref 0.05–1.2)
MONOCYTES NFR BLD: 12 % (ref 3–10)
NEUTS SEG # BLD: 3.7 K/UL (ref 1.8–8)
NEUTS SEG NFR BLD: 56 % (ref 40–73)
NRBC # BLD: 0 K/UL (ref 0–0.01)
NRBC BLD-RTO: 0 PER 100 WBC
PLATELET # BLD AUTO: 220 K/UL (ref 135–420)
PMV BLD AUTO: 10.6 FL (ref 9.2–11.8)
POTASSIUM SERPL-SCNC: 4.3 MMOL/L (ref 3.5–5.5)
PROT SERPL-MCNC: 7.2 G/DL (ref 6.4–8.2)
PROTHROMBIN TIME: 13 SEC (ref 11.9–14.9)
RBC # BLD AUTO: 6.05 M/UL (ref 4.35–5.65)
SODIUM SERPL-SCNC: 141 MMOL/L (ref 136–145)
WBC # BLD AUTO: 6.7 K/UL (ref 4.6–13.2)

## 2024-10-30 PROCEDURE — 85730 THROMBOPLASTIN TIME PARTIAL: CPT

## 2024-10-30 PROCEDURE — 85025 COMPLETE CBC W/AUTO DIFF WBC: CPT

## 2024-10-30 PROCEDURE — 36415 COLL VENOUS BLD VENIPUNCTURE: CPT

## 2024-10-30 PROCEDURE — 85610 PROTHROMBIN TIME: CPT

## 2024-10-30 PROCEDURE — 80053 COMPREHEN METABOLIC PANEL: CPT

## 2024-10-31 ENCOUNTER — ANESTHESIA EVENT (OUTPATIENT)
Facility: HOSPITAL | Age: 64
End: 2024-10-31
Payer: COMMERCIAL

## 2024-11-04 ENCOUNTER — APPOINTMENT (OUTPATIENT)
Facility: HOSPITAL | Age: 64
End: 2024-11-04
Attending: ORTHOPAEDIC SURGERY
Payer: COMMERCIAL

## 2024-11-04 ENCOUNTER — ANESTHESIA (OUTPATIENT)
Facility: HOSPITAL | Age: 64
End: 2024-11-04
Payer: COMMERCIAL

## 2024-11-04 ENCOUNTER — HOSPITAL ENCOUNTER (OUTPATIENT)
Facility: HOSPITAL | Age: 64
Setting detail: OUTPATIENT SURGERY
Discharge: HOME HEALTH | End: 2024-11-04
Attending: ORTHOPAEDIC SURGERY | Admitting: ORTHOPAEDIC SURGERY
Payer: COMMERCIAL

## 2024-11-04 VITALS
WEIGHT: 276.1 LBS | DIASTOLIC BLOOD PRESSURE: 52 MMHG | RESPIRATION RATE: 16 BRPM | HEART RATE: 79 BPM | BODY MASS INDEX: 38.65 KG/M2 | TEMPERATURE: 98 F | HEIGHT: 71 IN | SYSTOLIC BLOOD PRESSURE: 111 MMHG | OXYGEN SATURATION: 94 %

## 2024-11-04 DIAGNOSIS — Z96.642 STATUS POST TOTAL HIP REPLACEMENT, LEFT: Primary | ICD-10-CM

## 2024-11-04 LAB
ABO + RH BLD: NORMAL
BLOOD GROUP ANTIBODIES SERPL: NORMAL
GLUCOSE BLD STRIP.AUTO-MCNC: 127 MG/DL (ref 70–110)
SPECIMEN EXP DATE BLD: NORMAL

## 2024-11-04 PROCEDURE — 6360000002 HC RX W HCPCS: Performed by: ORTHOPAEDIC SURGERY

## 2024-11-04 PROCEDURE — 7100000010 HC PHASE II RECOVERY - FIRST 15 MIN: Performed by: ORTHOPAEDIC SURGERY

## 2024-11-04 PROCEDURE — 6360000002 HC RX W HCPCS: Performed by: NURSE ANESTHETIST, CERTIFIED REGISTERED

## 2024-11-04 PROCEDURE — 82962 GLUCOSE BLOOD TEST: CPT

## 2024-11-04 PROCEDURE — 2580000003 HC RX 258: Performed by: ORTHOPAEDIC SURGERY

## 2024-11-04 PROCEDURE — 7100000000 HC PACU RECOVERY - FIRST 15 MIN: Performed by: ORTHOPAEDIC SURGERY

## 2024-11-04 PROCEDURE — A4217 STERILE WATER/SALINE, 500 ML: HCPCS | Performed by: ORTHOPAEDIC SURGERY

## 2024-11-04 PROCEDURE — 86850 RBC ANTIBODY SCREEN: CPT

## 2024-11-04 PROCEDURE — 2580000003 HC RX 258: Performed by: NURSE ANESTHETIST, CERTIFIED REGISTERED

## 2024-11-04 PROCEDURE — 7100000001 HC PACU RECOVERY - ADDTL 15 MIN: Performed by: ORTHOPAEDIC SURGERY

## 2024-11-04 PROCEDURE — 6360000002 HC RX W HCPCS

## 2024-11-04 PROCEDURE — 2500000003 HC RX 250 WO HCPCS: Performed by: NURSE ANESTHETIST, CERTIFIED REGISTERED

## 2024-11-04 PROCEDURE — 3600000012 HC SURGERY LEVEL 2 ADDTL 15MIN: Performed by: ORTHOPAEDIC SURGERY

## 2024-11-04 PROCEDURE — 86900 BLOOD TYPING SEROLOGIC ABO: CPT

## 2024-11-04 PROCEDURE — 6370000000 HC RX 637 (ALT 250 FOR IP): Performed by: ORTHOPAEDIC SURGERY

## 2024-11-04 PROCEDURE — 2720000010 HC SURG SUPPLY STERILE: Performed by: ORTHOPAEDIC SURGERY

## 2024-11-04 PROCEDURE — 3700000000 HC ANESTHESIA ATTENDED CARE: Performed by: ORTHOPAEDIC SURGERY

## 2024-11-04 PROCEDURE — 97165 OT EVAL LOW COMPLEX 30 MIN: CPT

## 2024-11-04 PROCEDURE — 73501 X-RAY EXAM HIP UNI 1 VIEW: CPT

## 2024-11-04 PROCEDURE — 6360000002 HC RX W HCPCS: Performed by: ANESTHESIOLOGY

## 2024-11-04 PROCEDURE — 7100000011 HC PHASE II RECOVERY - ADDTL 15 MIN: Performed by: ORTHOPAEDIC SURGERY

## 2024-11-04 PROCEDURE — 97535 SELF CARE MNGMENT TRAINING: CPT

## 2024-11-04 PROCEDURE — 3600000002 HC SURGERY LEVEL 2 BASE: Performed by: ORTHOPAEDIC SURGERY

## 2024-11-04 PROCEDURE — 2709999900 HC NON-CHARGEABLE SUPPLY: Performed by: ORTHOPAEDIC SURGERY

## 2024-11-04 PROCEDURE — 36415 COLL VENOUS BLD VENIPUNCTURE: CPT

## 2024-11-04 PROCEDURE — 86901 BLOOD TYPING SEROLOGIC RH(D): CPT

## 2024-11-04 PROCEDURE — 2500000003 HC RX 250 WO HCPCS

## 2024-11-04 PROCEDURE — 3700000001 HC ADD 15 MINUTES (ANESTHESIA): Performed by: ORTHOPAEDIC SURGERY

## 2024-11-04 PROCEDURE — 2580000003 HC RX 258

## 2024-11-04 PROCEDURE — 97116 GAIT TRAINING THERAPY: CPT

## 2024-11-04 PROCEDURE — 97161 PT EVAL LOW COMPLEX 20 MIN: CPT

## 2024-11-04 PROCEDURE — 2500000003 HC RX 250 WO HCPCS: Performed by: ORTHOPAEDIC SURGERY

## 2024-11-04 PROCEDURE — 6370000000 HC RX 637 (ALT 250 FOR IP): Performed by: ANESTHESIOLOGY

## 2024-11-04 PROCEDURE — C1776 JOINT DEVICE (IMPLANTABLE): HCPCS | Performed by: ORTHOPAEDIC SURGERY

## 2024-11-04 DEVICE — 6.5MM LOW PROFILE HEX SCREW 25MM
Type: IMPLANTABLE DEVICE | Site: HIP | Status: FUNCTIONAL
Brand: TRIDENT II

## 2024-11-04 DEVICE — CERAMIC V40 FEMORAL HEAD
Type: IMPLANTABLE DEVICE | Site: HIP | Status: FUNCTIONAL
Brand: BIOLOX

## 2024-11-04 DEVICE — TRIDENT II TRITANIUM CLUSTER 54E
Type: IMPLANTABLE DEVICE | Site: HIP | Status: FUNCTIONAL
Brand: TRIDENT II

## 2024-11-04 DEVICE — HIP STEM - STANDARD OFFSET
Type: IMPLANTABLE DEVICE | Site: HIP | Status: FUNCTIONAL
Brand: INSIGNIA

## 2024-11-04 DEVICE — TRIDENT X3 0 DEGREE POLYETHYLENE INSERT
Type: IMPLANTABLE DEVICE | Site: HIP | Status: FUNCTIONAL
Brand: TRIDENT X3 INSERT

## 2024-11-04 RX ORDER — ACETAMINOPHEN 325 MG/1
650 TABLET ORAL EVERY 6 HOURS
Status: CANCELLED | OUTPATIENT
Start: 2024-11-04

## 2024-11-04 RX ORDER — SODIUM CHLORIDE 0.9 % (FLUSH) 0.9 %
5-40 SYRINGE (ML) INJECTION PRN
Status: CANCELLED | OUTPATIENT
Start: 2024-11-04

## 2024-11-04 RX ORDER — ONDANSETRON 4 MG/1
4 TABLET, ORALLY DISINTEGRATING ORAL EVERY 8 HOURS PRN
Status: CANCELLED | OUTPATIENT
Start: 2024-11-04

## 2024-11-04 RX ORDER — DROPERIDOL 2.5 MG/ML
0.62 INJECTION, SOLUTION INTRAMUSCULAR; INTRAVENOUS
Status: DISCONTINUED | OUTPATIENT
Start: 2024-11-04 | End: 2024-11-04 | Stop reason: HOSPADM

## 2024-11-04 RX ORDER — LABETALOL HYDROCHLORIDE 5 MG/ML
10 INJECTION, SOLUTION INTRAVENOUS
Status: DISCONTINUED | OUTPATIENT
Start: 2024-11-04 | End: 2024-11-04 | Stop reason: HOSPADM

## 2024-11-04 RX ORDER — MELOXICAM 7.5 MG/1
15 TABLET ORAL DAILY
Status: CANCELLED | OUTPATIENT
Start: 2024-11-04 | End: 2024-11-07

## 2024-11-04 RX ORDER — TRANEXAMIC ACID 10 MG/ML
1000 INJECTION, SOLUTION INTRAVENOUS ONCE
Status: COMPLETED | OUTPATIENT
Start: 2024-11-04 | End: 2024-11-04

## 2024-11-04 RX ORDER — HYDROMORPHONE HYDROCHLORIDE 1 MG/ML
0.5 INJECTION, SOLUTION INTRAMUSCULAR; INTRAVENOUS; SUBCUTANEOUS EVERY 5 MIN PRN
Status: DISCONTINUED | OUTPATIENT
Start: 2024-11-04 | End: 2024-11-04 | Stop reason: HOSPADM

## 2024-11-04 RX ORDER — SODIUM CHLORIDE 9 MG/ML
INJECTION, SOLUTION INTRAVENOUS PRN
Status: DISCONTINUED | OUTPATIENT
Start: 2024-11-04 | End: 2024-11-04 | Stop reason: HOSPADM

## 2024-11-04 RX ORDER — DEXAMETHASONE SODIUM PHOSPHATE 4 MG/ML
INJECTION, SOLUTION INTRA-ARTICULAR; INTRALESIONAL; INTRAMUSCULAR; INTRAVENOUS; SOFT TISSUE
Status: DISCONTINUED | OUTPATIENT
Start: 2024-11-04 | End: 2024-11-04 | Stop reason: SDUPTHER

## 2024-11-04 RX ORDER — OXYCODONE AND ACETAMINOPHEN 5; 325 MG/1; MG/1
1 TABLET ORAL EVERY 6 HOURS PRN
Qty: 28 TABLET | Refills: 0 | Status: SHIPPED | OUTPATIENT
Start: 2024-11-04 | End: 2024-11-11

## 2024-11-04 RX ORDER — MEPERIDINE HYDROCHLORIDE 50 MG/ML
12.5 INJECTION INTRAMUSCULAR; INTRAVENOUS; SUBCUTANEOUS AS NEEDED
Status: DISCONTINUED | OUTPATIENT
Start: 2024-11-04 | End: 2024-11-04 | Stop reason: HOSPADM

## 2024-11-04 RX ORDER — SODIUM CHLORIDE, SODIUM LACTATE, POTASSIUM CHLORIDE, CALCIUM CHLORIDE 600; 310; 30; 20 MG/100ML; MG/100ML; MG/100ML; MG/100ML
INJECTION, SOLUTION INTRAVENOUS CONTINUOUS
Status: CANCELLED | OUTPATIENT
Start: 2024-11-04

## 2024-11-04 RX ORDER — DIPHENHYDRAMINE HYDROCHLORIDE 50 MG/ML
12.5 INJECTION INTRAMUSCULAR; INTRAVENOUS
Status: DISCONTINUED | OUTPATIENT
Start: 2024-11-04 | End: 2024-11-04 | Stop reason: HOSPADM

## 2024-11-04 RX ORDER — OXYCODONE HYDROCHLORIDE 5 MG/1
5 TABLET ORAL EVERY 4 HOURS PRN
Status: DISCONTINUED | OUTPATIENT
Start: 2024-11-04 | End: 2024-11-04 | Stop reason: HOSPADM

## 2024-11-04 RX ORDER — NALOXONE HYDROCHLORIDE 0.4 MG/ML
INJECTION, SOLUTION INTRAMUSCULAR; INTRAVENOUS; SUBCUTANEOUS PRN
Status: DISCONTINUED | OUTPATIENT
Start: 2024-11-04 | End: 2024-11-04 | Stop reason: HOSPADM

## 2024-11-04 RX ORDER — POVIDONE-IODINE 5 %
SOLUTION, NON-ORAL OPHTHALMIC (EYE) PRN
Status: DISCONTINUED | OUTPATIENT
Start: 2024-11-04 | End: 2024-11-04 | Stop reason: ALTCHOICE

## 2024-11-04 RX ORDER — LIDOCAINE HYDROCHLORIDE 20 MG/ML
INJECTION, SOLUTION EPIDURAL; INFILTRATION; INTRACAUDAL; PERINEURAL
Status: DISCONTINUED | OUTPATIENT
Start: 2024-11-04 | End: 2024-11-04 | Stop reason: SDUPTHER

## 2024-11-04 RX ORDER — SODIUM CHLORIDE 9 MG/ML
INJECTION, SOLUTION INTRAVENOUS PRN
Status: CANCELLED | OUTPATIENT
Start: 2024-11-04

## 2024-11-04 RX ORDER — SODIUM CHLORIDE 0.9 % (FLUSH) 0.9 %
5-40 SYRINGE (ML) INJECTION EVERY 12 HOURS SCHEDULED
Status: DISCONTINUED | OUTPATIENT
Start: 2024-11-04 | End: 2024-11-04 | Stop reason: HOSPADM

## 2024-11-04 RX ORDER — SODIUM CHLORIDE 0.9 % (FLUSH) 0.9 %
5-40 SYRINGE (ML) INJECTION PRN
Status: DISCONTINUED | OUTPATIENT
Start: 2024-11-04 | End: 2024-11-04 | Stop reason: HOSPADM

## 2024-11-04 RX ORDER — SODIUM CHLORIDE, SODIUM LACTATE, POTASSIUM CHLORIDE, CALCIUM CHLORIDE 600; 310; 30; 20 MG/100ML; MG/100ML; MG/100ML; MG/100ML
INJECTION, SOLUTION INTRAVENOUS CONTINUOUS
Status: DISCONTINUED | OUTPATIENT
Start: 2024-11-04 | End: 2024-11-04 | Stop reason: HOSPADM

## 2024-11-04 RX ORDER — IPRATROPIUM BROMIDE AND ALBUTEROL SULFATE 2.5; .5 MG/3ML; MG/3ML
1 SOLUTION RESPIRATORY (INHALATION)
Status: DISCONTINUED | OUTPATIENT
Start: 2024-11-04 | End: 2024-11-04 | Stop reason: HOSPADM

## 2024-11-04 RX ORDER — ASPIRIN 81 MG/1
81 TABLET ORAL 2 TIMES DAILY
Qty: 60 TABLET | Refills: 0 | Status: SHIPPED | OUTPATIENT
Start: 2024-11-04

## 2024-11-04 RX ORDER — PROPOFOL 10 MG/ML
INJECTION, EMULSION INTRAVENOUS
Status: DISCONTINUED | OUTPATIENT
Start: 2024-11-04 | End: 2024-11-04 | Stop reason: SDUPTHER

## 2024-11-04 RX ORDER — MAGNESIUM HYDROXIDE 1200 MG/15ML
LIQUID ORAL CONTINUOUS PRN
Status: COMPLETED | OUTPATIENT
Start: 2024-11-04 | End: 2024-11-04

## 2024-11-04 RX ORDER — SODIUM CHLORIDE 9 MG/ML
INJECTION, SOLUTION INTRAVENOUS CONTINUOUS
Status: DISCONTINUED | OUTPATIENT
Start: 2024-11-04 | End: 2024-11-04 | Stop reason: HOSPADM

## 2024-11-04 RX ORDER — FENTANYL CITRATE 50 UG/ML
25 INJECTION, SOLUTION INTRAMUSCULAR; INTRAVENOUS EVERY 5 MIN PRN
Status: DISCONTINUED | OUTPATIENT
Start: 2024-11-04 | End: 2024-11-04 | Stop reason: HOSPADM

## 2024-11-04 RX ORDER — OXYCODONE HYDROCHLORIDE 5 MG/1
5 TABLET ORAL
Status: COMPLETED | OUTPATIENT
Start: 2024-11-04 | End: 2024-11-04

## 2024-11-04 RX ORDER — GLYCOPYRROLATE 0.2 MG/ML
INJECTION INTRAMUSCULAR; INTRAVENOUS
Status: DISCONTINUED | OUTPATIENT
Start: 2024-11-04 | End: 2024-11-04 | Stop reason: SDUPTHER

## 2024-11-04 RX ORDER — ONDANSETRON 2 MG/ML
INJECTION INTRAMUSCULAR; INTRAVENOUS
Status: DISCONTINUED | OUTPATIENT
Start: 2024-11-04 | End: 2024-11-04 | Stop reason: SDUPTHER

## 2024-11-04 RX ORDER — SODIUM CHLORIDE 0.9 % (FLUSH) 0.9 %
5-40 SYRINGE (ML) INJECTION EVERY 12 HOURS SCHEDULED
Status: CANCELLED | OUTPATIENT
Start: 2024-11-04

## 2024-11-04 RX ORDER — FENTANYL CITRATE 50 UG/ML
INJECTION, SOLUTION INTRAMUSCULAR; INTRAVENOUS
Status: DISCONTINUED | OUTPATIENT
Start: 2024-11-04 | End: 2024-11-04 | Stop reason: SDUPTHER

## 2024-11-04 RX ORDER — SODIUM CHLORIDE, SODIUM LACTATE, POTASSIUM CHLORIDE, CALCIUM CHLORIDE 600; 310; 30; 20 MG/100ML; MG/100ML; MG/100ML; MG/100ML
INJECTION, SOLUTION INTRAVENOUS
Status: DISCONTINUED | OUTPATIENT
Start: 2024-11-04 | End: 2024-11-04 | Stop reason: SDUPTHER

## 2024-11-04 RX ORDER — OXYCODONE HYDROCHLORIDE 5 MG/1
10 TABLET ORAL EVERY 4 HOURS PRN
Status: DISCONTINUED | OUTPATIENT
Start: 2024-11-04 | End: 2024-11-04 | Stop reason: HOSPADM

## 2024-11-04 RX ORDER — ONDANSETRON 2 MG/ML
4 INJECTION INTRAMUSCULAR; INTRAVENOUS
Status: DISCONTINUED | OUTPATIENT
Start: 2024-11-04 | End: 2024-11-04 | Stop reason: HOSPADM

## 2024-11-04 RX ORDER — MIDAZOLAM HYDROCHLORIDE 1 MG/ML
INJECTION, SOLUTION INTRAMUSCULAR; INTRAVENOUS
Status: DISCONTINUED | OUTPATIENT
Start: 2024-11-04 | End: 2024-11-04 | Stop reason: SDUPTHER

## 2024-11-04 RX ORDER — ONDANSETRON 2 MG/ML
4 INJECTION INTRAMUSCULAR; INTRAVENOUS EVERY 6 HOURS PRN
Status: CANCELLED | OUTPATIENT
Start: 2024-11-04

## 2024-11-04 RX ORDER — CEPHALEXIN 500 MG/1
500 CAPSULE ORAL 4 TIMES DAILY
Qty: 8 CAPSULE | Refills: 0 | Status: SHIPPED | OUTPATIENT
Start: 2024-11-04

## 2024-11-04 RX ORDER — ASPIRIN 325 MG
325 TABLET, DELAYED RELEASE (ENTERIC COATED) ORAL 2 TIMES DAILY
Status: CANCELLED | OUTPATIENT
Start: 2024-11-04

## 2024-11-04 RX ADMIN — GLYCOPYRROLATE 0.1 MG: 0.2 INJECTION INTRAMUSCULAR; INTRAVENOUS at 07:45

## 2024-11-04 RX ADMIN — WATER 3000 MG: 1 INJECTION INTRAMUSCULAR; INTRAVENOUS; SUBCUTANEOUS at 12:53

## 2024-11-04 RX ADMIN — TRANEXAMIC ACID 1000 MG: 10 INJECTION, SOLUTION INTRAVENOUS at 08:00

## 2024-11-04 RX ADMIN — FENTANYL CITRATE 25 MCG: 50 INJECTION, SOLUTION INTRAMUSCULAR; INTRAVENOUS at 10:13

## 2024-11-04 RX ADMIN — TRANEXAMIC ACID 1000 MG: 10 INJECTION, SOLUTION INTRAVENOUS at 10:13

## 2024-11-04 RX ADMIN — MIDAZOLAM 2 MG: 1 INJECTION INTRAMUSCULAR; INTRAVENOUS at 07:45

## 2024-11-04 RX ADMIN — PROPOFOL 30 MG: 10 INJECTION, EMULSION INTRAVENOUS at 08:03

## 2024-11-04 RX ADMIN — MEPIVACAINE HYDROCHLORIDE 60 MG: 15 INJECTION, SOLUTION EPIDURAL; INFILTRATION at 07:54

## 2024-11-04 RX ADMIN — FENTANYL CITRATE 25 MCG: 50 INJECTION, SOLUTION INTRAMUSCULAR; INTRAVENOUS at 08:35

## 2024-11-04 RX ADMIN — FENTANYL CITRATE 25 MCG: 50 INJECTION INTRAMUSCULAR; INTRAVENOUS at 11:26

## 2024-11-04 RX ADMIN — SODIUM CHLORIDE, SODIUM LACTATE, POTASSIUM CHLORIDE, AND CALCIUM CHLORIDE: 600; 310; 30; 20 INJECTION, SOLUTION INTRAVENOUS at 10:45

## 2024-11-04 RX ADMIN — FENTANYL CITRATE 25 MCG: 50 INJECTION, SOLUTION INTRAMUSCULAR; INTRAVENOUS at 09:56

## 2024-11-04 RX ADMIN — SODIUM CHLORIDE: 9 INJECTION, SOLUTION INTRAVENOUS at 06:18

## 2024-11-04 RX ADMIN — MIDAZOLAM 2 MG: 1 INJECTION INTRAMUSCULAR; INTRAVENOUS at 07:49

## 2024-11-04 RX ADMIN — FENTANYL CITRATE 25 MCG: 50 INJECTION, SOLUTION INTRAMUSCULAR; INTRAVENOUS at 09:36

## 2024-11-04 RX ADMIN — FENTANYL CITRATE 25 MCG: 50 INJECTION INTRAMUSCULAR; INTRAVENOUS at 11:35

## 2024-11-04 RX ADMIN — DEXAMETHASONE SODIUM PHOSPHATE 10 MG: 4 INJECTION INTRA-ARTICULAR; INTRALESIONAL; INTRAMUSCULAR; INTRAVENOUS; SOFT TISSUE at 08:11

## 2024-11-04 RX ADMIN — FENTANYL CITRATE 25 MCG: 50 INJECTION, SOLUTION INTRAMUSCULAR; INTRAVENOUS at 08:03

## 2024-11-04 RX ADMIN — WATER 3000 MG: 1 INJECTION INTRAMUSCULAR; INTRAVENOUS; SUBCUTANEOUS at 07:58

## 2024-11-04 RX ADMIN — FENTANYL CITRATE 25 MCG: 50 INJECTION, SOLUTION INTRAMUSCULAR; INTRAVENOUS at 07:46

## 2024-11-04 RX ADMIN — FENTANYL CITRATE 25 MCG: 50 INJECTION, SOLUTION INTRAMUSCULAR; INTRAVENOUS at 09:17

## 2024-11-04 RX ADMIN — ONDANSETRON HYDROCHLORIDE 4 MG: 2 INJECTION INTRAMUSCULAR; INTRAVENOUS at 08:08

## 2024-11-04 RX ADMIN — FENTANYL CITRATE 25 MCG: 50 INJECTION, SOLUTION INTRAMUSCULAR; INTRAVENOUS at 10:23

## 2024-11-04 RX ADMIN — OXYCODONE 5 MG: 5 TABLET ORAL at 12:19

## 2024-11-04 RX ADMIN — LIDOCAINE HYDROCHLORIDE 50 MG: 20 INJECTION, SOLUTION EPIDURAL; INFILTRATION; INTRACAUDAL; PERINEURAL at 07:58

## 2024-11-04 RX ADMIN — PROPOFOL 100 MCG/KG/MIN: 10 INJECTION, EMULSION INTRAVENOUS at 08:06

## 2024-11-04 RX ADMIN — PROPOFOL 30 MG: 10 INJECTION, EMULSION INTRAVENOUS at 07:58

## 2024-11-04 ASSESSMENT — PAIN DESCRIPTION - ORIENTATION
ORIENTATION: LEFT
ORIENTATION: RIGHT
ORIENTATION: LEFT

## 2024-11-04 ASSESSMENT — PAIN DESCRIPTION - ONSET
ONSET: ON-GOING

## 2024-11-04 ASSESSMENT — HOOS JR
RISING FROM SITTING: MODERATE
LYING IN BED (TURNING OVER, MAINTAINING HIP POSITION): SEVERE
WALKING ON UNEVEN SURFACE: EXTREME
HOOS JR TOTAL INTERVAL SCORE: 39.902
GOING UP OR DOWN STAIRS: MODERATE
HOOS JR RAW SCORE: 16
BENDING TO THE FLOOR TO PICK UP OBJECT: SEVERE
SITTING: MODERATE
HOOS JR RAW SCORE: 16

## 2024-11-04 ASSESSMENT — PAIN DESCRIPTION - DESCRIPTORS
DESCRIPTORS: ACHING
DESCRIPTORS: SORE
DESCRIPTORS: ACHING

## 2024-11-04 ASSESSMENT — PAIN DESCRIPTION - LOCATION
LOCATION: HIP

## 2024-11-04 ASSESSMENT — PROMIS GLOBAL HEALTH SCALE
TO WHAT EXTENT ARE YOU ABLE TO CARRY OUT YOUR EVERYDAY PHYSICAL ACTIVITIES SUCH AS WALKING, CLIMBING STAIRS, CARRYING GROCERIES, OR MOVING A CHAIR [ON A SCALE OF 1 (NOT AT ALL) TO 5 (COMPLETELY)]?: MODERATELY
IN GENERAL, WOULD YOU SAY YOUR QUALITY OF LIFE IS...[ON A SCALE OF 1 (POOR) TO 5 (EXCELLENT)]: VERY GOOD
IN THE PAST 7 DAYS, HOW OFTEN HAVE YOU BEEN BOTHERED BY EMOTIONAL PROBLEMS, SUCH AS FEELING ANXIOUS, DEPRESSED, OR IRRITABLE [ON A SCALE FROM 1 (NEVER) TO 5 (ALWAYS)]?: RARELY
IN GENERAL, WOULD YOU SAY YOUR HEALTH IS...[ON A SCALE OF 1 (POOR) TO 5 (EXCELLENT)]: GOOD
SUM OF RESPONSES TO QUESTIONS 3, 6, 7, & 8: 16
HOW IS THE PROMIS V1.1 BEING ADMINISTERED?: PAPER
IN THE PAST 7 DAYS, HOW WOULD YOU RATE YOUR PAIN ON AVERAGE [ON A SCALE FROM 0 (NO PAIN) TO 10 (WORST IMAGINABLE PAIN)]?: 6
IN GENERAL, HOW WOULD YOU RATE YOUR SATISFACTION WITH YOUR SOCIAL ACTIVITIES AND RELATIONSHIPS [ON A SCALE OF 1 (POOR) TO 5 (EXCELLENT)]?: FAIR
IN GENERAL, HOW WOULD YOU RATE YOUR MENTAL HEALTH, INCLUDING YOUR MOOD AND YOUR ABILITY TO THINK [ON A SCALE OF 1 (POOR) TO 5 (EXCELLENT)]?: GOOD
IN GENERAL, PLEASE RATE HOW WELL YOU CARRY OUT YOUR USUAL SOCIAL ACTIVITIES (INCLUDES ACTIVITIES AT HOME, AT WORK, AND IN YOUR COMMUNITY, AND RESPONSIBILITIES AS A PARENT, CHILD, SPOUSE, EMPLOYEE, FRIEND, ETC) [ON A SCALE OF 1 (POOR) TO 5 (EXCELLENT)]?: FAIR
SUM OF RESPONSES TO QUESTIONS 2, 4, 5, & 10: 13
IN GENERAL, HOW WOULD YOU RATE YOUR PHYSICAL HEALTH [ON A SCALE OF 1 (POOR) TO 5 (EXCELLENT)]?: GOOD
WHO IS THE PERSON COMPLETING THE PROMIS V1.1 SURVEY?: SELF
IN THE PAST 7 DAYS, HOW WOULD YOU RATE YOUR FATIGUE ON AVERAGE [ON A SCALE FROM 1 (NONE) TO 5 (VERY SEVERE)]?: MILD

## 2024-11-04 ASSESSMENT — PAIN DESCRIPTION - FREQUENCY
FREQUENCY: CONTINUOUS

## 2024-11-04 ASSESSMENT — PAIN SCALES - GENERAL
PAINLEVEL_OUTOF10: 6
PAINLEVEL_OUTOF10: 5
PAINLEVEL_OUTOF10: 7
PAINLEVEL_OUTOF10: 6
PAINLEVEL_OUTOF10: 5
PAINLEVEL_OUTOF10: 0
PAINLEVEL_OUTOF10: 0

## 2024-11-04 ASSESSMENT — PAIN - FUNCTIONAL ASSESSMENT
PAIN_FUNCTIONAL_ASSESSMENT: ACTIVITIES ARE NOT PREVENTED
PAIN_FUNCTIONAL_ASSESSMENT: ACTIVITIES ARE NOT PREVENTED
PAIN_FUNCTIONAL_ASSESSMENT: PREVENTS OR INTERFERES SOME ACTIVE ACTIVITIES AND ADLS
PAIN_FUNCTIONAL_ASSESSMENT: ACTIVITIES ARE NOT PREVENTED
PAIN_FUNCTIONAL_ASSESSMENT: 0-10

## 2024-11-04 ASSESSMENT — PAIN DESCRIPTION - PAIN TYPE
TYPE: SURGICAL PAIN

## 2024-11-04 NOTE — H&P
history on file.    Allergies:   Allergies   Allergen Reactions    Codeine Headaches        Review of Systems:  A comprehensive review of systems was negative except for that written in the History of Present Illness.    Objective:       Physical Exam:  HEENT: Normocephalic, atraumatic  Lungs:  Clear to auscultation  Heart:   Regular rate and rhythm  Abdomen: Soft  Extremities:  Pain with range of motion of the left hip, groin tenderness, and antalgic gait.    Assessment:      Arthritis of the left hip.    Plan:       Proceed with scheduled left total hip replacement.  The various methods of treatment have been discussed with the patient and family. After consideration of risks, benefits, and other options for treatment, the patient has consented to surgical interventions. Questions were answered and preoperative teaching was done by Dr. Lara.     Signed By: Sandra Major PA-C     November 3, 2024

## 2024-11-04 NOTE — DISCHARGE INSTRUCTIONS
bleeding inside the surgery area. Your doctor also may have given you medicines that help stop the bleeding.  How can you care for yourself at home?  If you have strips of tape on the incision, leave the tape on until it falls off. Or follow your doctor's instructions for removing the tape. Keep the area dry at all times.  You will have a dressing over the incision. A dressing helps the incision heal and protects it. Your doctor will tell you how to take care of this.  If you do not have tape on the incision, wash the area daily with warm, soapy water, and pat it dry. Don't use hydrogen peroxide or alcohol, which can slow healing.    When should you call for help?    Call your doctor now or seek immediate medical care if:    You are dizzy or lightheaded, or you feel like you may faint.     You have bleeding that starts again or gets worse, such as soaking one or more bandages over 2 to 4 hours, even after holding pressure on the area.         __________________________________________________________________________________________________________________________________

## 2024-11-04 NOTE — PERIOP NOTE
Reviewed discharge plan of care with patient and his wife, written instructions provided as well. They verbalized understanding

## 2024-11-04 NOTE — INTERVAL H&P NOTE
Update History & Physical    The patient's History and Physical of November 3, 2024 was reviewed with the patient and I examined the patient. There was no change. The surgical site was confirmed by the patient and me.     Plan: The risks, benefits, expected outcome, and alternative to the recommended procedure have been discussed with the patient. Patient understands and wants to proceed with the procedure.     Electronically signed by THOM SCHMIDT MD on 11/4/2024 at 7:23 AM

## 2024-11-04 NOTE — ANESTHESIA PROCEDURE NOTES
Spinal Block    End time: 11/4/2024 7:54 AM  Reason for block: primary anesthetic  Staffing  Performed: resident/CRNA   Resident/CRNA: Dinorah Mccarty APRN - CRNA  Performed by: Dinorah Mccarty APRN - CRNA  Authorized by: Bk Loera MD    Spinal Block  Patient position: sitting  Prep: Betadine  Patient monitoring: frequent blood pressure checks and continuous pulse ox  Approach: midline  Location: L3/L4  Provider prep: mask and sterile gloves  Local infiltration: lidocaine  Needle  Needle type: Pencan   Needle gauge: 24 G  Needle length: 4 in  Assessment  Swirl obtained: Yes  CSF: clear  Attempts: 1  Hemodynamics: stable  Preanesthetic Checklist  Completed: patient identified, IV checked, site marked, risks and benefits discussed, surgical/procedural consents, equipment checked, pre-op evaluation, timeout performed, anesthesia consent given, oxygen available, monitors applied/VS acknowledged, fire risk safety assessment completed and verbalized and blood product R/B/A discussed and consented

## 2024-11-04 NOTE — ANESTHESIA PRE PROCEDURE
Department of Anesthesiology  Preprocedure Note       Name:  Jesus Martinez   Age:  64 y.o.  :  1960                                          MRN:  [id number]         Date:  2024      Surgeon: Surgeon(s):  Adi Lara MD    Procedure: Procedure(s):  LEFT TOTAL HIP REPLACEMENT ANTERIOR APPROACH WITH C-ARM \"SPEC POP\"    Medications prior to admission:   Prior to Admission medications    Medication Sig Start Date End Date Taking? Authorizing Provider   levothyroxine (SYNTHROID) 75 MCG tablet Take 1 tablet by mouth Daily   Yes Yon Huitron MD   atorvastatin (LIPITOR) 40 MG tablet Take 1 tablet by mouth daily   Yes Yon Huitron MD   testosterone (ANDROGEL; TESTIM) 50 MG/5GM (1%) GEL 1% gel Apply topically three times a week.    Yon Huitron MD   lisinopril (PRINIVIL;ZESTRIL) 10 MG tablet Take 1 tablet by mouth daily    Yon Huitron MD   Meloxicam 15 MG TBDP Take by mouth daily    Yon Huitron MD   tirzepatide-weight management (ZEPBOUND) 2.5 MG/0.5ML SOAJ subCUTAneous auto-injector pen once a week 24   Yon Huitron MD   Omega-3 Fatty Acids (FISH OIL) 1000 MG capsule Take by mouth daily    Yon Huitron MD   glucosamine-chondroitin 500-400 MG tablet Take 1 tablet by mouth daily    Yon Huitron MD       Current medications:    Current Facility-Administered Medications   Medication Dose Route Frequency Provider Last Rate Last Admin   • ceFAZolin (ANCEF) 3,000 mg in sterile water 30 mL IV syringe  3,000 mg IntraVENous On Call to OR Adi Lara MD       • 0.9 % sodium chloride infusion   IntraVENous Continuous Adi Lara MD 50 mL/hr at 24 0618 New Bag at 24 0618       Allergies:    Allergies   Allergen Reactions   • Codeine Headaches       Problem List:  There is no problem list on file for this patient.      Past Medical History:        Diagnosis Date   • Arthritis     hip, lumbar   • Factor V Leiden (HCC)    • Hx of

## 2024-11-04 NOTE — PROGRESS NOTES
Asked for sign out   
Patient tolerating ice chips.   
TRANSFER - IN REPORT:    Verbal report received from RN,CRNA on Jesus Martinez  being received from OR for routine post-op      Report consisted of patient's Situation, Background, Assessment and   Recommendations(SBAR).     Information from the following report(s) Nurse Handoff Report was reviewed with the receiving nurse.    Opportunity for questions and clarification was provided.      Assessment completed upon patient's arrival to unit and care assumed.     
TRANSFER - OUT REPORT:    Verbal report given to JAZIEL Carmichael on Jesus Martinez  being transferred to [facility] for routine progression of patient care       Report consisted of patient's Situation, Background, Assessment and   Recommendations(SBAR).     Information from the following report(s) Nurse Handoff Report, Adult Overview, Surgery Report, Intake/Output, and MAR was reviewed with the receiving nurse.           Lines:   Peripheral IV 11/04/24 Distal;Left;Posterior Forearm (Active)   Site Assessment Clean, dry & intact 11/04/24 1137   Line Status Infusing 11/04/24 1137   Line Care Connections checked and tightened 11/04/24 1137   Phlebitis Assessment No symptoms 11/04/24 1137   Infiltration Assessment 0 11/04/24 1137   Alcohol Cap Used No 11/04/24 1137   Dressing Status Clean, dry & intact 11/04/24 1137   Dressing Type Transparent 11/04/24 1137   Dressing Intervention New 11/04/24 1137        Opportunity for questions and clarification was provided.      Patient transported with:  Registered Nurse        
  Patient stated “Is it normal to feel a little dizzy?”    OBJECTIVE DATA SUMMARY:     Past Medical History:   Diagnosis Date    Arthritis     hip, lumbar    Factor V Leiden (HCC)     Hx of blood clots     Hyperlipidemia     Hypertension     2004    Thyroid disease     hypo     Past Surgical History:   Procedure Laterality Date    ANTERIOR CRUCIATE LIGAMENT REPAIR Left     knee    JOINT REPLACEMENT Right     hip    KNEE SURGERY Right     cartilage    TONSILLECTOMY         Home Situation:  Social/Functional History  Lives With: Spouse  Type of Home: House  Home Layout: One level  Home Access: Stairs to enter with rails  Entrance Stairs - Number of Steps: 3-4  Entrance Stairs - Rails: Both (wide)  Bathroom Shower/Tub: Walk-in shower  Bathroom Toilet: Handicap height  Bathroom Equipment: Built-in shower seat  Home Equipment: Cane, Walker - Rolling  Critical Behavior:  Orientation  Orientation Level: Oriented to place;Oriented to situation;Oriented to person  Cognition  Overall Cognitive Status: WFL  Strength:    Strength: Generally decreased, functional  Tone & Sensation:   Tone: Normal  Sensation: Impaired (L hip)  Range Of Motion:  AROM: Generally decreased, functional  PROM: Generally decreased, functional  Functional Mobility:  Bed Mobility:  Bed Mobility Training  Bed Mobility Training: No  Transfers:  Transfer Training  Transfer Training: Yes  Interventions: Safety awareness training;Verbal cues;Tactile cues  Sit to Stand: Contact-guard assistance;Additional time  Stand to Sit: Contact-guard assistance;Additional time  Balance:   Balance  Sitting: Intact  Standing: Impaired  Standing - Static: Fair;Constant support  Standing - Dynamic: Fair;Constant support  Wheelchair Mobility:  Ambulation/Gait Training:  Gait  Gait Training: Yes  Left Side Weight Bearing: As tolerated  Overall Level of Assistance: Contact-guard assistance;Additional time  Distance (ft): 75 Feet  Assistive Device: Gait belt;Walker, 
the patient's independence.

## 2024-11-04 NOTE — OP NOTE
[facility]  Total Left Hip Arthroplasty    Patient: Jesus Martinez MRN: [id number]  SSN: [id number]    YOB: 1960  Age: 64 y.o.  Sex: male      Date of Surgery: [unfilled]   Preoperative Diagnosis: Osteoarthritis of left hip, unspecified osteoarthritis type [M16.12]   Postoperative Diagnosis: Osteoarthritis of left hip, unspecified osteoarthritis type [M16.12]    Location: [facility]  Surgeon: THOM SCHMIDT MD  Assistant:  Sandra PATEL    Anesthesia: Spinal     Procedure: Total Left Hip Arthroplasty    Findings:  Degenerative joint disease of the left hip.     Estimated Blood Loss: 350 cc    Specimens: None    Complications: None    Implants:   Implant Name Type Inv. Item Serial No.  Lot No. LRB No. Used Action   SHELL ACET SZ E [id number] 5 CLUS H TRITANIUM PRESSFIT RUBIO - [id number]  SHELL ACET SZ E [id number] 5 CLUS H TRITANIUM PRESSFIT RUBIO  [employer] [facility] [id number] Left 1 Implanted   SCREW BNE L25MM DIA6.5MM HEX LO PROF TRIDENT II - [id number]  SCREW BNE L25MM DIA6.5MM HEX LO PROF TRIDENT II  [employer] [facility] [id number] Left 1 Implanted   INSERT ACET E 0 DEG 36 MM HIP X3 TRIDENT - [id number]  INSERT ACET E 0 DEG 36 MM HIP X3 TRIDENT  [employer] [facility] [id number] Left 1 Implanted   STEM FEM STD OFFSET 6 HIP CLLRD INSIGNIA - [id number]  STEM FEM STD OFFSET 6 HIP CLLRD INSIGNIA  [employer] [facility] [id number] Left 1 Implanted   HEAD FEM [id number] -2.5MM OFFSET HIP BIOLOX DELT CERAMIC TAPR - [id number]  HEAD FEM [id number] -2.5MM OFFSET HIP BIOLOX DELT CERAMIC TAPR  [employer] [facility] [id number] Left 1 Implanted       Procedure Detail:  After the patient was brought to the operating suite, He was effectively anesthetized using general anesthesia, then transferred to the Tripp table and secured in a standard fashion. His left hip was then prepped with Chloroprep and draped in a normal sterile orthopedic

## 2024-11-04 NOTE — BRIEF OP NOTE
Brief Postoperative Note      Patient: Jesus Martinez  YOB: 1960  MRN: [id number]    Date of Procedure: 11/4/2024    Pre-Op Diagnosis Codes:      * Osteoarthritis of left hip, unspecified osteoarthritis type [M16.12]    Post-Op Diagnosis: Same       Procedure(s):  LEFT TOTAL HIP REPLACEMENT ANTERIOR APPROACH WITH C-ARM \"SPEC POP\"    Surgeon(s):  Adi Lara MD    Assistant:  * No surgical staff found *    Anesthesia: Spinal    Estimated Blood Loss (mL): 350      Complications: None    Specimens:   * No specimens in log *    Implants:  Implant Name Type Inv. Item Serial No.  Lot No. LRB No. Used Action   SHELL ACET SZ E [id number] 5 CLUS H TRITANIUM PRESSFIT RUBIO - [id number]  SHELL ACET SZ E [id number] 5 CLUS H TRITANIUM PRESSFIT RUBIO  [employer] [facility] [id number] Left 1 Implanted   SCREW BNE L25MM DIA6.5MM HEX LO PROF TRIDENT II - [id number]  SCREW BNE L25MM DIA6.5MM HEX LO PROF TRIDENT II  [employer] [facility] [id number] Left 1 Implanted   INSERT ACET E 0 DEG 36 MM HIP X3 TRIDENT - [id number]  INSERT ACET E 0 DEG 36 MM HIP X3 TRIDENT  [employer] [facility] [id number] Left 1 Implanted   STEM FEM STD OFFSET 6 HIP CLLRD INSIGNIA - [id number]  STEM FEM STD OFFSET 6 HIP CLLRD INSIGNIA  [employer] [facility] [id number] Left 1 Implanted   HEAD FEM [id number] -2.5MM OFFSET HIP BIOLOX DELT CERAMIC TAPR - [id number]  HEAD FEM [id number] -2.5MM OFFSET HIP BIOLOX DELT CERAMIC TAPR  [employer] [facility] [id number] Left 1 Implanted         Drains: * No LDAs found *    Findings:  Infection Present At Time Of Surgery (PATOS) (choose all levels that have infection present):  No infection present  Other Findings: Severe DJD    Electronically signed by Sandra Major PA-C on 11/4/2024 at 11:07 AM

## 2024-11-04 NOTE — ANESTHESIA POSTPROCEDURE EVALUATION
Department of Anesthesiology  Postprocedure Note    Patient: Jesus Martinez  MRN: [id number]  YOB: 1960  Date of evaluation: 11/4/2024    Procedure Summary       Date: 11/04/24 Room / Location: [facility] MAIN 05 / [facility] MAIN OR    Anesthesia Start: 0745 Anesthesia Stop: 1100    Procedure: LEFT TOTAL HIP REPLACEMENT ANTERIOR APPROACH WITH C-ARM \"SPEC POP\" (Left: Hip) Diagnosis:       Osteoarthritis of left hip, unspecified osteoarthritis type      (Osteoarthritis of left hip, unspecified osteoarthritis type [M16.12])    Surgeons: Adi Lara MD Responsible Provider: Bk Loera MD    Anesthesia Type: Spinal ASA Status: 2            Anesthesia Type: Spinal    Cindi Phase I: Cindi Score: 10    Cindi Phase II:      Anesthesia Post Evaluation    Patient location during evaluation: PACU  Patient participation: complete - patient participated  Level of consciousness: awake and alert  Airway patency: patent  Nausea & Vomiting: no nausea and no vomiting  Cardiovascular status: blood pressure returned to baseline  Respiratory status: acceptable  Hydration status: euvolemic  Pain management: adequate    No notable events documented.

## 2024-12-03 ENCOUNTER — TELEPHONE (OUTPATIENT)
Facility: HOSPITAL | Age: 64
End: 2024-12-03

## 2024-12-05 ENCOUNTER — HOSPITAL ENCOUNTER (OUTPATIENT)
Facility: HOSPITAL | Age: 64
Setting detail: RECURRING SERIES
Discharge: HOME | End: 2024-12-08
Payer: COMMERCIAL

## 2024-12-05 PROCEDURE — 97110 THERAPEUTIC EXERCISES: CPT

## 2024-12-05 PROCEDURE — 97161 PT EVAL LOW COMPLEX 20 MIN: CPT

## 2024-12-05 NOTE — PROGRESS NOTES
PT DAILY TREATMENT NOTE/HIP [id number]    Patient Name: Jesus Martinez  Date:2024  : 1960  [x]  Patient  Verified  [facility] / Plan: [facility] HEALTH BENEFIT PL / Product Type: *No Product type* /    In time:926  Out time:101  Total Treatment Time (min): 25  Visit #: 1 of 16    Treatment Area: Pain in left hip [M25.552]    SUBJECTIVE  Pain Level (0-10 scale): 2  []constant []intermittent [x]improving []worsening []no change since onset    Any medication changes, allergies to medications, adverse drug reactions, diagnosis change, or new procedure performed?: [x] No    [] Yes (see summary sheet for update)  Subjective functional status/changes:     Patient has c/c of left hip pain since undergoing left anterior FROILAN 24. Patient has completed home PT and is now referred to outpatient PT for continued rehabilitation. Patient describes pain as anterior left hip ache. Pain is worse in PM. Denies numbness/tingling. Denies popping/clicking. Aggravating factors:weight bearing activities. Alleviating factors: rest in supported positions.  Denies red flags: SOB, chest pain, dizziness/lightheadedness, blurred/double vision, HA, chills/fevers, night sweats, change in bowel/bladder control, abdominal pain, difficulty swallowing, slurred speech, unexplained weight gain/loss, nausea, vomiting. PMHx: obesity, osteoarthritis. Surgical Hx: right posterior hip 15 years ago. Social Hx: , two level home, no alcohol, no tobacco, part time sedentary work. PLOF: ambulatory community distances, active in [employer] gym 3x/wk. CLOF: ambulation limited to household distances.  Diagnostic Imaging: post op good aligntment       OBJECTIVE/EXAMINATION    Precautions: anterior hip precautions    20 min [x]Eval                  []Re-Eval       28 min Therapeutic Exercise:  [x] See flow sheet :   Rationale: increase ROM, increase strength and decrease pain to improve the patient’s ability to complete ADLs       With   []

## 2024-12-05 NOTE — PROGRESS NOTES
[facility]  [address] [address]  Phone: [phone]   Fax: [phone]    Plan of Care/ Statement of Necessity for Physical Therapy Services        Patient name: Jesus Martinez Start of Care: 2024   Referral source: Sandra Major PA-C : 1960    Medical Diagnosis: Pain in left hip [M25.552]      Onset Date: 24    Treatment Diagnosis:  M25.552  LEFT HIP PAIN                                           Prior Hospitalization: see medical history Provider#: [id number]   Medications: Verified on Patient Summary List     Comorbidities: right FROILAN  Prior Level of Function: ambulatory community distances, active in [facility] gym 3x/wk       The Plan of Care and following information is based on the information from the initial evaluation.  Assessment/ key information: Patient presents s/p left anterior FROILAN performed 2024 with deficits in left hip AROM and strength, mild to moderate anterior left hip pain, moderate difficulty with sit to stand transfers and antalgic gait pattern and limited stand/walk tolerance. Patient has [facility] gym membership has been very consistent in initial HEP. Patient expresses strong desire to return to gym program ASAP. Patient will be instructed in appropriate progressive gym program as well as progressive exercises for HEP.    Patient will continue to benefit from skilled PT services to modify and progress therapeutic interventions, address functional mobility deficits, address ROM deficits, address strength deficits, analyze and address soft tissue restrictions, analyze and cue movement patterns, analyze and modify body mechanics/ergonomics and assess and modify postural abnormalities to attain remaining goals.    Evaluation Complexity HistoryMEDIUM  Complexity : 1-2 comorbidities / personal factors will impact the outcome/ POC  ; Examination LOW Complexity : 1-2 Standardized tests and measures addressing body structure,

## 2024-12-19 ENCOUNTER — HOSPITAL ENCOUNTER (OUTPATIENT)
Facility: HOSPITAL | Age: 64
Setting detail: RECURRING SERIES
Discharge: HOME | End: 2024-12-22
Payer: COMMERCIAL

## 2024-12-19 PROCEDURE — 97110 THERAPEUTIC EXERCISES: CPT

## 2024-12-19 PROCEDURE — 97530 THERAPEUTIC ACTIVITIES: CPT

## 2024-12-19 PROCEDURE — 97112 NEUROMUSCULAR REEDUCATION: CPT

## 2024-12-19 NOTE — PROGRESS NOTES
PHYSICAL / OCCUPATIONAL THERAPY - DAILY TREATMENT NOTE (updated )    Patient Name: Jesus Martinez    Date: 2024    : 1960  Insurance: Payor: ALEXANDREA / Plan: [facility]ALTH BENEFIT PL / Product Type: *No Product type* /      Patient  verified Yes     Visit #   Current / Total 2 8   Time   In / Out 1722 1811   Pain   In / Out 0 1   Subjective Functional Status/Changes: \"I am doing pretty well. I am going to the [employer] gym about 3 times per week and using the leg strengthening machines.\"   Changes to:  Meds, Allergies, Med Hx, Sx Hx?  If yes, update Summary List no       TREATMENT AREA =  Pain in left hip [M25.552]    If an interpreting service is utilized for treatment of this patient, the contents of this document represent the material reviewed with the patient via the .     OBJECTIVE    Therapeutic Procedures:  Tx Min Billable or 1:1 Min (if diff from Tx Min) Procedure, Rationale, Specifics   24  14267 Therapeutic Exercise (timed):  increase ROM, strength, coordination, balance, and proprioception to improve patient's ability to progress to PLOF and address remaining functional goals. (see flow sheet as applicable)     Details if applicable:       15  12901 Therapeutic Activity (timed):  use of dynamic activities replicating functional movements to increase ROM, strength, coordination, balance, and proprioception in order to improve patient's ability to progress to PLOF and address remaining functional goals.  (see flow sheet as applicable)     Details if applicable:     10  76204 Neuromuscular Re-Education (timed):  improve balance, coordination, kinesthetic sense, posture, core stability and proprioception to improve patient's ability to develop conscious control of individual muscles and awareness of position of extremities in order to progress to PLOF and address remaining functional goals. (see flow sheet as applicable)     Details if applicable:     49  [facility] Totals Reminder:

## 2025-01-02 ENCOUNTER — HOSPITAL ENCOUNTER (OUTPATIENT)
Facility: HOSPITAL | Age: 65
Setting detail: RECURRING SERIES
Discharge: HOME | End: 2025-01-05
Payer: COMMERCIAL

## 2025-01-02 PROCEDURE — 97112 NEUROMUSCULAR REEDUCATION: CPT

## 2025-01-02 PROCEDURE — 97110 THERAPEUTIC EXERCISES: CPT

## 2025-01-02 PROCEDURE — 97530 THERAPEUTIC ACTIVITIES: CPT

## 2025-01-02 NOTE — PROGRESS NOTES
PHYSICAL / OCCUPATIONAL THERAPY - DAILY TREATMENT NOTE (updated )    Patient Name: Jesus Martinez    Date: 2025    : 1960  Insurance: Payor: ALEXANDREA / Plan: [facility]ALTH BENEFIT PL / Product Type: *No Product type* /      Patient  verified Yes     Visit #   Current / Total 3 8   Time   In / Out 1009 1059   Pain   In / Out 0 0   Subjective Functional Status/Changes: \"I can tell I am getting stronger and getting more movement in the left hip.\"   Changes to:  Meds, Allergies, Med Hx, Sx Hx?  If yes, update Summary List no       TREATMENT AREA =  Pain in left hip [M25.552]    If an interpreting service is utilized for treatment of this patient, the contents of this document represent the material reviewed with the patient via the .     OBJECTIVE    Therapeutic Procedures:  Tx Min Billable or 1:1 Min (if diff from Tx Min) Procedure, Rationale, Specifics   25  49339 Therapeutic Exercise (timed):  increase ROM, strength, coordination, balance, and proprioception to improve patient's ability to progress to PLOF and address remaining functional goals. (see flow sheet as applicable)     Details if applicable:       15  68930 Therapeutic Activity (timed):  use of dynamic activities replicating functional movements to increase ROM, strength, coordination, balance, and proprioception in order to improve patient's ability to progress to PLOF and address remaining functional goals.  (see flow sheet as applicable)     Details if applicable:     10  48675 Neuromuscular Re-Education (timed):  improve balance, coordination, kinesthetic sense, posture, core stability and proprioception to improve patient's ability to develop conscious control of individual muscles and awareness of position of extremities in order to progress to PLOF and address remaining functional goals. (see flow sheet as applicable)     Details if applicable:     50  [facility] Totals Reminder: bill using total billable min of TIMED

## 2025-01-02 NOTE — PROGRESS NOTES
[facility]  [address] [address]  Phone: [phone]   Fax: [phone]    Discharge Summary    Patient name: Jesus Martinez Start of Care: 2024   Referral source: Sandra Major PA-C : 1960               Medical Diagnosis: Pain in left hip [M25.552]      Onset Date: 24               Treatment Diagnosis:  M25.552  LEFT HIP PAIN                                           Prior Hospitalization: see medical history Provider#: [id number]   Medications: Verified on Patient Summary List      Comorbidities: right FROILAN  Prior Level of Function: ambulatory community distances, active in [facility] gym 3x/wk     Visits from Start of Care: 3    Missed Visits: 0    Reporting Period : 24 to 25    Goals/Measure of Progress:  Short Term Goals: To be accomplished in 4 weeks:                 Patient will report compliance with HEP 1x/day to aid in rehabilitation program.                 Status at IE: Patient instructed in and provided written copy of initial Home Exercise Program.                 Current: Patient reports good consistent with home and gym based exercise program.         Met, 24                    Patient will increase left hip ROM flex to 110, ext to 10 degrees to aid in completion of ADLs                 Status at IE:left hip flex 75, ext 2 deg                 Current: left hip flex 88, ext 9 deg   In-progress, 25        Long Term Goals: To be accomplished in 8 weeks:                 Patient will increase [place] LE strength to 5/5 MMT throughout to aid in completion of ADLs.                 Status at IE: left hip 4/5, except flex 3-/5                 Current: left hip 4+/5, except flex 4/5   In-progress, 25                    Patient will report pain no greater than 0-2/10 throughout entire day to aid in completion of ADLs                 Status at IE:2-5/10                 Current: 0-2/10        Met, 25                    Patient

## 2025-01-02 NOTE — PROGRESS NOTES
Physical Therapy Discharge Instructions    [facility] at [facility]  [address] [address]  Phone: [phone]   Fax: [phone]      Patient: Jesus Martinez  : 1960    Continue Home Exercise Program 2 times per day for 6 weeks, then decrease to 4 times per week      Continue with    [] Ice  as needed      [x] Heat           Follow up with MD:     [] Upon completion of therapy     [x] As needed      Recommendations:     []   Return to activity with home program    [x]   Return to activity with the following modifications:       []Post Rehab Program    [x]Join Independent aquatic program     [x]Return to/join local gym      Additional Comments: Please contact clinic at above phone number if you have any questions regarding Home Exercise Program or self care instructions.

## (undated) DEVICE — DRESSING FOAM 4X8IN DISP POSTOP MEPILEX BORD AG

## (undated) DEVICE — SYRINGE MED 10ML LUERLOCK TIP W/O SFTY DISP

## (undated) DEVICE — SYRINGE MED 30ML STD CLR PLAS LUERLOCK TIP N CTRL DISP

## (undated) DEVICE — SOLUTION IRRIG 500ML 0.9% SOD CHLO USP POUR PLAS BTL

## (undated) DEVICE — SUTURE MONOCRYL SZ 2-0 L36IN ABSRB UD L36MM CT-1 1/2 CIR Y945H

## (undated) DEVICE — OSCILLATING TIP SAW CARTRIDGE: Brand: PRECISION FALCON

## (undated) DEVICE — SUTURE MONOCRYL + SZ 3-0 L27IN ABSRB UD PS1 L24MM 3/8 CIR REV MCP936H

## (undated) DEVICE — HANDPIECE SET WITH HIGH FLOW TIP AND SUCTION TUBE: Brand: INTERPULSE

## (undated) DEVICE — PACK SURG CUST ANTR HIP MIH

## (undated) DEVICE — SOLUTION IRRIG 3000ML LAC RINGERS ARTHROMTC PLAS CONT

## (undated) DEVICE — SHEET,DRAPE,40X58,STERILE: Brand: MEDLINE

## (undated) DEVICE — SOLUTION IRRIG 1500ML STRL H2O AQUALITE PLAS POUR BTL

## (undated) DEVICE — ADHESIVE SKIN CLOSURE WND 8.661X1.5 IN 22 CM LIQUIBAND SECUR

## (undated) DEVICE — GLOVE ORANGE PI 7   MSG9070

## (undated) DEVICE — GARMENT,MEDLINE,DVT,INT,CALF,MED, GEN2: Brand: MEDLINE

## (undated) DEVICE — SOLUTION IV 250ML 0.9% SOD CHL CLR INJ FLX BG CONT PRT CLSR

## (undated) DEVICE — COAXIAL FEMORAL CANAL TIP

## (undated) DEVICE — 3M™ IOBAN™ 2 ANTIMICROBIAL INCISE DRAPE 6650EZ: Brand: IOBAN™ 2

## (undated) DEVICE — GOWN,SIRUS,NONRNF,SETINSLV,2XL,18/CS: Brand: MEDLINE

## (undated) DEVICE — SUTURE VICRYL + SZ 1 L36IN ABSRB UD L36MM CT-1 1/2 CIR VCP947H

## (undated) DEVICE — STERILE POLYISOPRENE POWDER-FREE SURGICAL GLOVES: Brand: PROTEXIS

## (undated) DEVICE — GLOVE SURG SZ 7 L12IN FNGR THK79MIL GRN LTX FREE

## (undated) DEVICE — HOOD, PEEL-AWAY: Brand: FLYTE

## (undated) DEVICE — NEEDLE SPNL L3.5IN PNK HUB S STL REG WALL FIT STYL W/ QNCKE

## (undated) DEVICE — BLUNTFILL: Brand: MONOJECT

## (undated) DEVICE — ELECTRODE PT RET AD L9FT HI MOIST COND ADH HYDRGEL CORDED

## (undated) DEVICE — BLADE,CARBON-STEEL,10,STRL,DISPOSABLE,TB: Brand: MEDLINE

## (undated) DEVICE — WEREWOLF FASTSEAL 6.0 HEMOSTASIS WAND: Brand: FASTSEAL 6.0 HEMOSTASIS WAND